# Patient Record
Sex: FEMALE | Race: WHITE | NOT HISPANIC OR LATINO | Employment: OTHER
[De-identification: names, ages, dates, MRNs, and addresses within clinical notes are randomized per-mention and may not be internally consistent; named-entity substitution may affect disease eponyms.]

---

## 2017-09-16 ENCOUNTER — HEALTH MAINTENANCE LETTER (OUTPATIENT)
Age: 32
End: 2017-09-16

## 2017-10-31 DIAGNOSIS — Z32.01 PREGNANCY TEST POSITIVE: Primary | ICD-10-CM

## 2017-11-09 ENCOUNTER — PRENATAL OFFICE VISIT (OUTPATIENT)
Dept: NURSING | Facility: CLINIC | Age: 32
End: 2017-11-09
Payer: COMMERCIAL

## 2017-11-09 DIAGNOSIS — Z32.01 PREGNANCY TEST POSITIVE: ICD-10-CM

## 2017-11-09 DIAGNOSIS — Z34.80 SUPERVISION OF OTHER NORMAL PREGNANCY, ANTEPARTUM: Primary | ICD-10-CM

## 2017-11-09 LAB
ABO + RH BLD: NORMAL
ABO + RH BLD: NORMAL
BETA HCG QUAL IFA URINE: POSITIVE
BLD GP AB SCN SERPL QL: NORMAL
BLOOD BANK CMNT PATIENT-IMP: NORMAL
ERYTHROCYTE [DISTWIDTH] IN BLOOD BY AUTOMATED COUNT: 12.1 % (ref 10–15)
HCT VFR BLD AUTO: 37.8 % (ref 35–47)
HGB BLD-MCNC: 13.2 G/DL (ref 11.7–15.7)
MCH RBC QN AUTO: 32.5 PG (ref 26.5–33)
MCHC RBC AUTO-ENTMCNC: 34.9 G/DL (ref 31.5–36.5)
MCV RBC AUTO: 93 FL (ref 78–100)
PLATELET # BLD AUTO: 233 10E9/L (ref 150–450)
RBC # BLD AUTO: 4.06 10E12/L (ref 3.8–5.2)
SPECIMEN EXP DATE BLD: NORMAL
WBC # BLD AUTO: 6.2 10E9/L (ref 4–11)

## 2017-11-09 PROCEDURE — 86900 BLOOD TYPING SEROLOGIC ABO: CPT | Performed by: OBSTETRICS & GYNECOLOGY

## 2017-11-09 PROCEDURE — 36415 COLL VENOUS BLD VENIPUNCTURE: CPT | Performed by: OBSTETRICS & GYNECOLOGY

## 2017-11-09 PROCEDURE — 86850 RBC ANTIBODY SCREEN: CPT | Performed by: OBSTETRICS & GYNECOLOGY

## 2017-11-09 PROCEDURE — 87086 URINE CULTURE/COLONY COUNT: CPT | Performed by: OBSTETRICS & GYNECOLOGY

## 2017-11-09 PROCEDURE — 85027 COMPLETE CBC AUTOMATED: CPT | Performed by: OBSTETRICS & GYNECOLOGY

## 2017-11-09 PROCEDURE — 84703 CHORIONIC GONADOTROPIN ASSAY: CPT | Performed by: OBSTETRICS & GYNECOLOGY

## 2017-11-09 PROCEDURE — 87389 HIV-1 AG W/HIV-1&-2 AB AG IA: CPT | Performed by: OBSTETRICS & GYNECOLOGY

## 2017-11-09 PROCEDURE — 86780 TREPONEMA PALLIDUM: CPT | Performed by: OBSTETRICS & GYNECOLOGY

## 2017-11-09 PROCEDURE — 86762 RUBELLA ANTIBODY: CPT | Performed by: OBSTETRICS & GYNECOLOGY

## 2017-11-09 PROCEDURE — 87340 HEPATITIS B SURFACE AG IA: CPT | Performed by: OBSTETRICS & GYNECOLOGY

## 2017-11-09 PROCEDURE — 86901 BLOOD TYPING SEROLOGIC RH(D): CPT | Performed by: OBSTETRICS & GYNECOLOGY

## 2017-11-09 PROCEDURE — 99207 ZZC NO CHARGE NURSE ONLY: CPT

## 2017-11-09 RX ORDER — PRENATAL VIT/IRON FUM/FOLIC AC 27MG-0.8MG
1 TABLET ORAL DAILY
COMMUNITY
End: 2018-07-18

## 2017-11-09 NOTE — NURSING NOTE
10w0d  Patient here for new prenatal nurse class. She has appt with Gayle Arce CNM on 11/10/17. Pap is not due. Last pap 8/11/16.    Tracy Crane RN

## 2017-11-09 NOTE — MR AVS SNAPSHOT
After Visit Summary   11/9/2017    Ernie Catherine    MRN: [id number]           Patient Information     Date Of Birth          1985        Visit Information        Provider Department      11/9/2017 9:00 AM RI PRENATAL NURSE [facility]        Today's Diagnoses     Supervision of other normal pregnancy, antepartum    -  1    Pregnancy test positive           Follow-ups after your visit        Your next 10 appointments already scheduled     Nov 10, 2017 10:00 AM CST   [facility] Prenatal with KELLY Francisco [facility]   [facility]    [address]  [facility] [id number] [phone]            Nov 10, 2017 12:00 PM CST   US OB < 14 WEEKS WITH TRANSVAGINAL SINGLE with SHUS5   [facility] ([facility])    [address] [place] [address] [phone]           Please bring a list of your medicines (including vitamins, minerals and over-the-counter drugs). Also, tell your doctor about any allergies you may have. Wear comfortable clothes and leave your valuables at home.  If you re less than 20 weeks drink four 8-ounce glasses of fluid an hour before your exam. If you need to empty your bladder before your exam, try to release only a little urine. Then, drink another glass of fluid.  You may have up to two family members in the exam room. If you bring a small child, an adult must be there to care for him or her.  Please call the Imaging Department at your exam site with any questions.              Future tests that were ordered for you today     Open Future Orders        Priority Expected Expires Ordered    US OB <14 Weeks w Transvaginal Single Routine  11/9/2018 11/9/2017            Who to contact     If you have questions or need follow up information about today's clinic visit or your schedule please contact [facility] directly at [phone].  Normal or non-critical  "lab and imaging results will be communicated to you by MyChart, letter or phone within 4 business days after the clinic has received the results. If you do not hear from us within 7 days, please contact the clinic through [employer] or phone. If you have a critical or abnormal lab result, we will notify you by phone as soon as possible.  Submit refill requests through [email or web address] or call your pharmacy and they will forward the refill request to us. Please allow 3 business days for your refill to be completed.          Additional Information About Your Visit        [employer] Information     [email or web address] lets you send messages to your doctor, view your test results, renew your prescriptions, schedule appointments and more. To sign up, go to [email or web address] . Click on \"Log in\" on the left side of the screen, which will take you to the Welcome page. Then click on \"Sign up Now\" on the right side of the page.     You will be asked to enter the access code listed below, as well as some personal information. Please follow the directions to create your username and password.     Your access code is: [email or web address]  Expires: 2018 11:14 AM     Your access code will  in 90 days. If you need help or a new code, please call your [facility] clinic or [phone].        Care EveryWhere ID     This is your Care EveryWhere ID. This could be used by other organizations to access your [facility] medical records  [id number]        Your Vitals Were     Last Period                   2017 (Exact Date)            Blood Pressure from Last 3 Encounters:   16 124/68   06/30/15 128/82   05/21/15 110/80    Weight from Last 3 Encounters:   16 175 lb (79.4 kg)   06/30/15 193 lb 9.6 oz (87.8 kg)   05/21/15 195 lb 9.6 oz (88.7 kg)              We Performed the Following     ABO/Rh type and screen     Anti Treponema     Beta HCG qual IFA urine - [facility] and [facility]     CBC with platelets     Hepatitis B surface antigen     HIV " Antigen Antibody Combo     Rubella Antibody IgG Quantitative     Urine Culture Aerobic Bacterial        Primary Care Provider Office Phone # Fax #    Kacy Olivo [phone]       [facility] [address] [place] [address]        Equal Access to Services     DOUGLAS ESTEBAN : Hadii aad ku hadasho Soomaali, waaxda luqadaha, qaybta kaalmada adeegyada, waxay idiin hayaan adeeg khbhavesh lashakila jackson. So [facility] [phone].    ATENCIÓN: Si habla español, tiene a farrell disposición servicios gratuitos de asistencia lingüística. Llame al [phone].    We comply with applicable federal civil rights laws and [place] laws. We do not discriminate on the basis of race, color, national origin, age, disability, sex, sexual orientation, or gender identity.            Thank you!     Thank you for choosing [facility]  for your care. Our goal is always to provide you with excellent care. Hearing back from our patients is one way we can continue to improve our services. Please take a few minutes to complete the written survey that you may receive in the mail after your visit with us. Thank you!             Your Updated Medication List - Protect others around you: Learn how to safely use, store and throw away your medicines at www.disposemymeds.org.          This list is accurate as of: 11/9/17 11:14 AM.  Always use your most recent med list.                   Brand Name Dispense Instructions for use Diagnosis    BENADRYL PO      Take by mouth nightly as needed        prenatal multivitamin plus iron 27-0.8 MG Tabs per tablet      Take 1 tablet by mouth daily        VITAMIN B6 PO

## 2017-11-10 ENCOUNTER — HOSPITAL ENCOUNTER (OUTPATIENT)
Dept: ULTRASOUND IMAGING | Facility: CLINIC | Age: 32
Discharge: HOME | End: 2017-11-10
Attending: ADVANCED PRACTICE MIDWIFE | Admitting: ADVANCED PRACTICE MIDWIFE
Payer: COMMERCIAL

## 2017-11-10 ENCOUNTER — PRENATAL OFFICE VISIT (OUTPATIENT)
Dept: OBGYN | Facility: CLINIC | Age: 32
End: 2017-11-10
Payer: COMMERCIAL

## 2017-11-10 ENCOUNTER — RESULT FOLLOW UP (OUTPATIENT)
Dept: OBGYN | Facility: CLINIC | Age: 32
End: 2017-11-10

## 2017-11-10 VITALS
HEART RATE: 88 BPM | SYSTOLIC BLOOD PRESSURE: 120 MMHG | HEIGHT: 68 IN | DIASTOLIC BLOOD PRESSURE: 68 MMHG | WEIGHT: 166 LBS | BODY MASS INDEX: 25.16 KG/M2

## 2017-11-10 DIAGNOSIS — R87.610 PAP SMEAR WITH ATYPICAL SQUAMOUS CELLS, CANNOT EXCLUDE HIGH GRADE SQUAMOUS INTRAEPITHELIAL LESION (ASC-H): ICD-10-CM

## 2017-11-10 DIAGNOSIS — Z34.81 ENCOUNTER FOR SUPERVISION OF OTHER NORMAL PREGNANCY, FIRST TRIMESTER: ICD-10-CM

## 2017-11-10 DIAGNOSIS — N87.1 DYSPLASIA OF CERVIX, HIGH GRADE CIN 2: ICD-10-CM

## 2017-11-10 DIAGNOSIS — Z11.3 SCREEN FOR STD (SEXUALLY TRANSMITTED DISEASE): Primary | ICD-10-CM

## 2017-11-10 DIAGNOSIS — Z34.80 SUPERVISION OF OTHER NORMAL PREGNANCY, ANTEPARTUM: ICD-10-CM

## 2017-11-10 LAB
BACTERIA SPEC CULT: NORMAL
HBV SURFACE AG SERPL QL IA: NONREACTIVE
HIV 1+2 AB+HIV1 P24 AG SERPL QL IA: NONREACTIVE
RUBV IGG SERPL IA-ACNC: 6 IU/ML
SPECIMEN SOURCE: NORMAL
T PALLIDUM IGG+IGM SER QL: NEGATIVE

## 2017-11-10 PROCEDURE — 76801 OB US < 14 WKS SINGLE FETUS: CPT

## 2017-11-10 PROCEDURE — G0476 HPV COMBO ASSAY CA SCREEN: HCPCS | Performed by: ADVANCED PRACTICE MIDWIFE

## 2017-11-10 PROCEDURE — G0124 SCREEN C/V THIN LAYER BY MD: HCPCS | Performed by: ADVANCED PRACTICE MIDWIFE

## 2017-11-10 PROCEDURE — 87591 N.GONORRHOEAE DNA AMP PROB: CPT | Performed by: ADVANCED PRACTICE MIDWIFE

## 2017-11-10 PROCEDURE — 87491 CHLMYD TRACH DNA AMP PROBE: CPT | Performed by: ADVANCED PRACTICE MIDWIFE

## 2017-11-10 PROCEDURE — G0145 SCR C/V CYTO,THINLAYER,RESCR: HCPCS | Performed by: ADVANCED PRACTICE MIDWIFE

## 2017-11-10 PROCEDURE — 99207 ZZC FIRST OB VISIT: CPT | Performed by: ADVANCED PRACTICE MIDWIFE

## 2017-11-10 NOTE — LETTER
November 25, 2019      Ernie Jimenes  [address] [address]    Dear ,      At [facility], your health and wellness is our primary concern. That is why we are following up on a Pap smear and ECC from 06/24/19. Your provider had recommended that you have a Pap smear and ECC completed by 12/24/19. Our records do not show that this has been scheduled.    It is important to complete the follow up that your provider has suggested for you to ensure that there are no worsening changes which may, over time, develop into cancer.      Please contact our office at  [phone] to schedule an appointment for a Pap smear and ECC at your earliest convenience. If you have questions or concerns, please call the clinic and we will be happy to assist you.    If you have completed the tests outside of [facility], please have the results forwarded to our office. We will update the chart for your primary Physician to review before your next annual physical.     Thank you for choosing [facility]!    Sincerely,      Your [facility] Care Team//[facility]

## 2017-11-10 NOTE — LETTER
April 30, 2019      Ernie Jimenes  [address] [address] [address]    Dear ,      At [facility], your health and wellness is our primary concern. That is why we are following up on a LEEP from 11/14/18, which was reported as MANUEL 3. Your provider had recommended that you have a Pap smear and HPV test completed by 05/14/19. Our records do not show that this has been scheduled.    It is important to complete the follow up that your provider has suggested for you to ensure that there are no worsening changes which may, over time, develop into cancer.      Please contact our office at  [phone] to schedule an appointment for a Pap smear and HPV test at your earliest convenience. If you have questions or concerns, please call the clinic and we will be happy to assist you.    If you have completed the tests outside of [facility], please have the results forwarded to our office. We will update the chart for your primary Physician to review before your next annual physical.     Thank you for choosing [facility]!    Sincerely,      Your [facility] Care Team/[facility]

## 2017-11-10 NOTE — MR AVS SNAPSHOT
After Visit Summary   11/10/2017    Ernie Catherine    MRN: [id number]           Patient Information     Date Of Birth          1985        Visit Information        Provider Department      11/10/2017 10:00 AM Gayle Arce APRN [facility]        Today's Diagnoses     Screen for STD (sexually transmitted disease)    -  1    Encounter for supervision of other normal pregnancy, first trimester           Follow-ups after your visit        Follow-up notes from your care team     Return in about 4 weeks (around 12/8/2017).      Your next 10 appointments already scheduled     Nov 10, 2017 12:00 PM CST   US OB < 14 WEEKS WITH TRANSVAGINAL SINGLE with SHUS5   [facility] ([facility])    [address] [place] [address] [phone]           Please bring a list of your medicines (including vitamins, minerals and over-the-counter drugs). Also, tell your doctor about any allergies you may have. Wear comfortable clothes and leave your valuables at home.  If you re less than 20 weeks drink four 8-ounce glasses of fluid an hour before your exam. If you need to empty your bladder before your exam, try to release only a little urine. Then, drink another glass of fluid.  You may have up to two family members in the exam room. If you bring a small child, an adult must be there to care for him or her.  Please call the Imaging Department at your exam site with any questions.            Dec 08, 2017  9:30 AM CST   ESTABLISHED PRENATAL with KELLY Francisco [facility] ([facility])    [address]  [facility] [id number] [phone]              Future tests that were ordered for you today     Open Future Orders        Priority Expected Expires Ordered    US OB <14 Weeks w Transvaginal Single Routine  11/9/2018 11/9/2017            Who to contact     If you have questions or need follow  "up information about today's clinic visit or your schedule please contact [facility] directly at [phone].  Normal or non-critical lab and imaging results will be communicated to you by MyChart, letter or phone within 4 business days after the clinic has received the results. If you do not hear from us within 7 days, please contact the clinic through [employer] or phone. If you have a critical or abnormal lab result, we will notify you by phone as soon as possible.  Submit refill requests through [employer] or call your pharmacy and they will forward the refill request to us. Please allow 3 business days for your refill to be completed.          Additional Information About Your Visit        MyChart Information     [employer] lets you send messages to your doctor, view your test results, renew your prescriptions, schedule appointments and more. To sign up, go to [email or web address] . Click on \"Log in\" on the left side of the screen, which will take you to the Welcome page. Then click on \"Sign up Now\" on the right side of the page.     You will be asked to enter the access code listed below, as well as some personal information. Please follow the directions to create your username and password.     Your access code is: [email or web address]  Expires: 2018 11:14 AM     Your access code will  in 90 days. If you need help or a new code, please call your [facility] clinic or [phone].        Care EveryWhere ID     This is your Care EveryWhere ID. This could be used by other organizations to access your [facility] medical records  [id number]        Your Vitals Were     Pulse Height Last Period Breastfeeding? BMI (Body Mass Index)       88 5' 8\" (1.727 m) 2017 (Exact Date) No 25.24 kg/m2        Blood Pressure from Last 3 Encounters:   11/10/17 120/68   16 124/68   06/30/15 128/82    Weight from Last 3 Encounters:   11/10/17 166 lb (75.3 kg)   16 175 lb (79.4 kg)   06/30/15 193 lb 9.6 oz " (87.8 kg)              We Performed the Following     CHLAMYDIA TRACHOMATIS PCR     HPV High Risk Types DNA Cervical     NEISSERIA GONORRHOEA PCR     Pap imaged thin layer screen with HPV - recommended age 30 - 65 years (select HPV order below)        Primary Care Provider Office Phone # Fax #    Kacy Olivo [phone]       [facility] [address] [address]        Equal Access to Services     [facility] : Hadii aad ku hadasho Soomaali, waaxda luqadaha, qaybta kaalmada adeegyada, waxay idiin hayaan adeeg kharash la'aan . So [facility] [phone].    ATENCIÓN: Si habla español, tiene a farrell disposición servicios gratuitos de asistencia lingüística. Llame al [phone].    We comply with applicable federal civil rights laws and [place] laws. We do not discriminate on the basis of race, color, national origin, age, disability, sex, sexual orientation, or gender identity.            Thank you!     Thank you for choosing [facility]  for your care. Our goal is always to provide you with excellent care. Hearing back from our patients is one way we can continue to improve our services. Please take a few minutes to complete the written survey that you may receive in the mail after your visit with us. Thank you!             Your Updated Medication List - Protect others around you: Learn how to safely use, store and throw away your medicines at www.disposemymeds.org.          This list is accurate as of: 11/10/17 10:55 AM.  Always use your most recent med list.                   Brand Name Dispense Instructions for use Diagnosis    BENADRYL PO      Take by mouth nightly as needed        prenatal multivitamin plus iron 27-0.8 MG Tabs per tablet      Take 1 tablet by mouth daily        VITAMIN B6 PO

## 2017-11-10 NOTE — NURSING NOTE
"Chief Complaint   Patient presents with     Prenatal Care     No concerns estimated 10 weeks 1 day        Initial /68 (BP Location: Right arm, Patient Position: Sitting, Cuff Size: Adult Regular)  Pulse 88  Ht 5' 8\" (1.727 m)  Wt 166 lb (75.3 kg)  LMP 08/31/2017 (Exact Date)  Breastfeeding? No  BMI 25.24 kg/m2 Estimated body mass index is 25.24 kg/(m^2) as calculated from the following:    Height as of this encounter: 5' 8\" (1.727 m).    Weight as of this encounter: 166 lb (75.3 kg).  Medication Reconciliation: complete   Dago Kelsey CMA       "

## 2017-11-10 NOTE — LETTER
[facility]  [address], [address]  [place] [address]  [phone]        November 13, 2017    Ernie Catherine  [address] [address] [address]            Dear MsVerito Ceronhaily:      The results of your recent GC Chlamydia were NORMAL.  Results for orders placed or performed in visit on 11/10/17   NEISSERIA GONORRHOEA PCR   Result Value Ref Range    Specimen Descrip Endocervical     N Gonorrhea PCR Negative NEG^Negative   CHLAMYDIA TRACHOMATIS PCR   Result Value Ref Range    Specimen Description Endocervical     Chlamydia Trachomatis PCR Negative NEG^Negative       If you have any further questions or problems, please contact our office.    Sincerely,      KELLY Zhao, ANNE MARIE

## 2017-11-10 NOTE — LETTER
July 26, 2018      Ernie Macarenatimo Jimenes  [address] [address] [facility] [address]    Dear MsLucy,      At [facility], your health and wellness is our primary concern. That is why we are following up on an abnormal pap from 11/10/17, which was reported as ASC-H and positive for high risk HPV 16. Your provider had recommended that you have a Colposcopy completed at your 6 week postpartum appointment. Our records do not show that this was done.    It is important to complete the follow up that your provider has suggested for you to ensure that there are no worsening changes which may, over time, develop into cancer.      Please contact our office at  [phone] to schedule an appointment for a Colposcopy at your earliest convenience. If you have questions or concerns, please call the clinic and we will be happy to assist you.    If you have completed the tests outside of [facility], please have the results forwarded to our office. We will update the chart for your primary Physician to review before your next annual physical.     Thank you for choosing [facility]!    Sincerely,      Manish Torres MD/[facility]

## 2017-11-10 NOTE — PROGRESS NOTES
PRENATAL VISIT   FIRST OBSTETRICAL EXAM - OB    Assessment / Impression     , Normal first prenatal visit at 10w1d      Plan:       1. Initial labs reviewed with patient today.    2. Pap smear today  3. GC/CT today  4. Denies any concerns with depression or anxiety.   5. Medications: Prenatal vitamins. Encouraged a vitamin D3, an omega 3 fatty acid supplement daily as well.   6. Problem list reviewed and updated.  7. Genetic screening: discussed and desires Progenity, will schedule after ultrasound to determine dates.  8. Role of ultrasound in pregnancy discussed; dating/viability ultrasound scheduled at [facility] today.   9. Optimal nutrition and weight gain discussed. Pregnancy weight gain of 15-25# encouraged.   10. Oriented to [facility] care and philosophy;  group, on-call and contact info discussed.  11. Appropriate anticipatory guidance including nutrition & supplements, weight gain recommendations, exercise, resources, lab testing & warning signs discussed.  .  12. Follow up: 4 weeks    TT with patient 40 minutes >50% time spent in counseling or coordination of care.    Subjective:    Ernie Catherine is a 31 year old  here today for her First Obstetrical Exam. Here alone.   Patient's last menstrual period was 2017 (exact date).  Last period was normal. Desires to follow with the midwives for this pregnancy.  States she has a history of anxiety after her last delivery.  Denies any current concerns.     Current symptoms also include: nausea in the evening, has been trying to use Vitamin B6, Benadryl at night.    Obstetric History       T1      L1     SAB1   TAB0   Ectopic0   Multiple0   Live Births1       # Outcome Date GA Lbr Shyam/2nd Weight Sex Delivery Anes PTL Lv   3 Current            2 Term 05/12/15 40w3d 07:21 / 04:06 6 lb 11.2 oz (3.039 kg) M Vag-Spont EPI N AMINAH      Name: Carlos      Apgar1:  9                Apgar5: 9   1 TAB  8w0d                 2018, by Last Menstrual  Period  Past Medical History:   Diagnosis Date     +++ OUTSIDE RECORDS SCAN  +++      Anxiety 7/14/2015     ASCUS on Pap smear 7/08    hpv 16 high risk, s/p colposcopy ob/gyn [place] basically nl     Esophageal reflux      History of colposcopy with cervical biopsy 08/11/16     neg     LSIL (low grade squamous intraepithelial lesion) on Pap smear 05/14/10     No active medical problems      Personal history of tobacco use, presenting hazards to health      Past Surgical History:   Procedure Laterality Date     COLPOSCOPY,BX CERVIX/ENDOCERV CURR  8/08    basically nl, per Dr. Grant [facility]     DENTAL SURGERY  2003     HC CREATE EARDRUM OPENING,GEN ANESTH  age 1    P.E. Tubes     NO       Social History   Substance Use Topics     Smoking status: Former Smoker     Packs/day: 0.25     Years: 4.00     Types: Cigarettes     Smokeless tobacco: Never Used     Alcohol use No     Current Outpatient Prescriptions   Medication Sig Dispense Refill     DiphenhydrAMINE HCl (BENADRYL PO) Take by mouth nightly as needed       Prenatal Vit-Fe Fumarate-FA (PRENATAL MULTIVITAMIN PLUS IRON) 27-0.8 MG TABS per tablet Take 1 tablet by mouth daily       Pyridoxine HCl (VITAMIN B6 PO)        Allergies   Allergen Reactions     No Known Drug Allergies           High Risk Behavior: none    Review of Systems  General:  Denies problem  Eyes: Denies problem  Ears/Nose/Throat: Denies problem  Cardiovascular: Denies problem  Respiratory:  Denies problem  Gastrointestinal:  Denies problem, Genitourinary: Denies problem  Musculoskeletal:  Denies problem  Skin: Denies problem  Neurologic: Denies problem  Psychiatric: Denies problem  Endocrine: Denies problem        Objective:     Physical Exam:  General Appearance: Alert, cooperative, no distress, appears stated age  Skin: Skin color, texture, turgor normal, no rashes or lesions  Throat: Lips, mucosa, and tongue normal; teeth and gums normal  HEENT: grossly normal; otoscopic and opthalmic exam  not performed.   Neck: Supple, symmetrical, trachea midline, no adenopathy;  thyroid: not enlarged, symmetric, no tenderness/mass/nodules  Lungs: Clear to auscultation bilaterally, respirations unlabored  Breasts: No breast masses, tenderness, asymmetry, or nipple discharge.  Heart: Regular rate and rhythm, S1 and S2 normal, no murmur, rub, or gallop   Abdomen: Soft, non-tender, no masses, no organomegaly  Pelvic:Pelvic exam: normal vagina and vulva, normal cervix without lesions or tenderness, uterus 10 week size adenxa normal in size without tenderness, pap smear and GC/CT collected.  Extremities: Extremities normal without varicosities or edema      Lab:   Results for orders placed or performed in visit on 11/09/17   CBC with platelets   Result Value Ref Range    WBC 6.2 4.0 - 11.0 10e9/L    RBC Count 4.06 3.8 - 5.2 10e12/L    Hemoglobin 13.2 11.7 - 15.7 g/dL    Hematocrit 37.8 35.0 - 47.0 %    MCV 93 78 - 100 fl    MCH 32.5 26.5 - 33.0 pg    MCHC 34.9 31.5 - 36.5 g/dL    RDW 12.1 10.0 - 15.0 %    Platelet Count 233 150 - 450 10e9/L   Hepatitis B surface antigen   Result Value Ref Range    Hep B Surface Agn Nonreactive NR^Nonreactive   Beta HCG qual IFA urine - [facility] and [facility] [place]   Result Value Ref Range    Beta HCG Qual IFA Urine Positive (A) NEG^Negative      HIV Antigen Antibody Combo   Result Value Ref Range    HIV Antigen Antibody Combo Nonreactive NR^Nonreactive       ABO/Rh type and screen   Result Value Ref Range    ABO A     RH(D) Pos     Antibody Screen Neg     Test Valid Only At [facility]        Specimen Expires 11/12/2017

## 2017-11-12 LAB
C TRACH DNA SPEC QL NAA+PROBE: NEGATIVE
N GONORRHOEA DNA SPEC QL NAA+PROBE: NEGATIVE
SPECIMEN SOURCE: NORMAL
SPECIMEN SOURCE: NORMAL

## 2017-11-16 LAB
COPATH REPORT: ABNORMAL
PAP: ABNORMAL

## 2017-11-17 LAB
FINAL DIAGNOSIS: ABNORMAL
HPV HR 12 DNA CVX QL NAA+PROBE: NEGATIVE
HPV16 DNA SPEC QL NAA+PROBE: POSITIVE
HPV18 DNA SPEC QL NAA+PROBE: NEGATIVE
SPECIMEN DESCRIPTION: ABNORMAL

## 2017-11-17 NOTE — PROGRESS NOTES
"02/17/05 NL pap  04/07/06 NL pap  07/02/07 NL pap  07/23/08 ASCUS pap +HPV #16 , pt. Is to schedule a colposcopy, pt. [facility]  05/14/10 LSIL pap, pt. returned to [facility] for a colp.  10/14/14 NL pap, + HPV #16 HR, pt. Is pregnant per DR. Torres, pt. Is to have a postpartum colposcopy, pts. EDC 5-10-14.  05/27/15 Pt. Delivered on 5-12-15, will do a postpartum colposcopy  07/22/15 Detailed message left on pts. Cell phone per pt. Consent to schedule a colposcopy  08/10/16 Nurse Dill spoke with pt.,she states that DR. Torres told her to schedule a colposcopy three months from her postpartum visit.  09/15/15 Reminder letter sent to pt. To schedule a colposcopy.  10/14/15 Telephone message out to DR. Torres to put pt. In the \"inactive file\" for pap tracking.  10/20/15 Due to no response from pt. Through telephone calls and reminder letters to schedule a postpartum colposcopy, pt. Has been put in the \"inactive file\" for pap tracking per DR. Torres.  08/11/16 [place]: ECC negative. Plan: co-testing in one year.  11/10/17 ASC-H pap, + HR HPV # 16. 11w1d pregnant. Plan post partum colposcopy. EDC of 6/7/18. (LN)   11/27/17 Left message for patient to call back. (LN) Patient notified of results/recommendations. (LN) Tracking updated to pt's due date 6/7/18 -- pt to schedule colp at 6 week postpartum visit (ajk)  6/12/18 Pt delivered 6/5/18, [place] due at 6wk PP 7/18/18 (rlm)  07/18/18 Discussed that patient is due for colposcopy. She will schedule at her convenience. ([facility])  07/26/18 [place] reminder letter sent. ([facility])  8/20/18 3mo [place] not done, updated to 6mo [place]/Pap due by 10/18/18 (rl) // 08/28/18 FYI sent to provider (azeem)  10/16/18 [place] ECC: at least MANUEL 2. Plan LEEP ([facility])  11/14/18 LEEP: MANUEL 3, pos margins. Plan 6 month cotest ([facility])  04/30/19 Cotest reminder letter sent. ([facility])  6/24/19 ECC: benign. Dx NIL pap, neg HR HPV. Plan repeat pap and ECC in 4-6 months ([facility])  11/25/19 Pap and ECC reminder letter " sent. ([facility])  1/3/20 ECC: benign. Dx NIL pap, neg HR HPV. Plan cotest due 11/2020 per provider (alla) Clinic staff left VM for pt with ECC information (alla)  01/13/20 Pt notified (alla)

## 2017-11-28 ENCOUNTER — PRE VISIT (OUTPATIENT)
Dept: MATERNAL FETAL MEDICINE | Facility: CLINIC | Age: 32
End: 2017-11-28

## 2017-12-01 ENCOUNTER — HOSPITAL ENCOUNTER (OUTPATIENT)
Dept: LAB | Facility: CLINIC | Age: 32
End: 2017-12-01
Attending: ADVANCED PRACTICE MIDWIFE
Payer: COMMERCIAL

## 2017-12-01 ENCOUNTER — HOSPITAL ENCOUNTER (OUTPATIENT)
Dept: ULTRASOUND IMAGING | Facility: CLINIC | Age: 32
Discharge: HOME | End: 2017-12-01
Attending: ADVANCED PRACTICE MIDWIFE | Admitting: ADVANCED PRACTICE MIDWIFE
Payer: COMMERCIAL

## 2017-12-01 ENCOUNTER — OFFICE VISIT (OUTPATIENT)
Dept: MATERNAL FETAL MEDICINE | Facility: CLINIC | Age: 32
End: 2017-12-01
Attending: ADVANCED PRACTICE MIDWIFE
Payer: COMMERCIAL

## 2017-12-01 DIAGNOSIS — O26.90 PREGNANCY RELATED CONDITION, ANTEPARTUM: ICD-10-CM

## 2017-12-01 DIAGNOSIS — Z13.9 VISIT FOR SCREENING: Primary | ICD-10-CM

## 2017-12-01 DIAGNOSIS — Z13.9 VISIT FOR SCREENING: ICD-10-CM

## 2017-12-01 DIAGNOSIS — Z36.9 FIRST TRIMESTER SCREENING: Primary | ICD-10-CM

## 2017-12-01 PROCEDURE — 40000072 ZZH STATISTIC GENETIC COUNSELING, < 16 MIN: Mod: ZF | Performed by: GENETIC COUNSELOR, MS

## 2017-12-01 PROCEDURE — 40000791 ZZHCL STATISTIC VERIFI PRENATAL TRISOMY 21,18,13: Performed by: OBSTETRICS & GYNECOLOGY

## 2017-12-01 PROCEDURE — 76813 OB US NUCHAL MEAS 1 GEST: CPT

## 2017-12-01 PROCEDURE — 36415 COLL VENOUS BLD VENIPUNCTURE: CPT | Performed by: OBSTETRICS & GYNECOLOGY

## 2017-12-01 NOTE — MR AVS SNAPSHOT
"              After Visit Summary   12/1/2017    Ernie Catherine    MRN: [id number]           Patient Information     Date Of Birth          1985        Visit Information        Provider Department      12/1/2017 2:45 PM Deepthi Archuleta MD [facility] Maternal Fetal Medicine [facility]        Today's Diagnoses     First trimester screening    -  1       Follow-ups after your visit        Your next 10 appointments already scheduled     Dec 08, 2017  9:30 AM CST   ESTABLISHED PRENATAL with KELLY Francisco CNM   [facility] ([facility])    [address]  [facility] [address]   [phone]              Future tests that were ordered for you today     Open Future Orders        Priority Expected Expires Ordered    Non Invasive Prenatal Test Cell Free DNA Routine 12/1/2017 12/1/2018 12/1/2017            Who to contact     If you have questions or need follow up information about today's clinic visit or your schedule please contact [facility] MATERNAL FETAL MEDICINE [facility] directly at [phone].  Normal or non-critical lab and imaging results will be communicated to you by [employer], letter or phone within 4 business days after the clinic has received the results. If you do not hear from us within 7 days, please contact the clinic through [employer] or phone. If you have a critical or abnormal lab result, we will notify you by phone as soon as possible.  Submit refill requests through [employer] or call your pharmacy and they will forward the refill request to us. Please allow 3 business days for your refill to be completed.          Additional Information About Your Visit        [employer] Information     [employer] lets you send messages to your doctor, view your test results, renew your prescriptions, schedule appointments and more. To sign up, go to [email or web address] . Click on \"Log in\" on the left side of the screen, which will take you to the Welcome page. Then click on " "\"Sign up Now\" on the right side of the page.     You will be asked to enter the access code listed below, as well as some personal information. Please follow the directions to create your username and password.     Your access code is: [email or web address]  Expires: 2018 11:14 AM     Your access code will  in 90 days. If you need help or a new code, please call your [place] clinic or [phone].        Care EveryWhere ID     This is your Care EveryWhere ID. This could be used by other organizations to access your [place] medical records  [id number]        Your Vitals Were     Last Period                   2017 (Exact Date)            Blood Pressure from Last 3 Encounters:   11/10/17 120/68   16 124/68   06/30/15 128/82    Weight from Last 3 Encounters:   11/10/17 75.3 kg (166 lb)   16 79.4 kg (175 lb)   06/30/15 87.8 kg (193 lb 9.6 oz)              Today, you had the following     No orders found for display       Primary Care Provider Office Phone # Fax #    Kacy Olivo [phone]       [facility] [place] [address] [place] [address]        Equal Access to Services     WINNIE ORELLANA AH: Hadii marisela ku hadasho Soomaali, waaxda luqadaha, qaybta kaalmada adeegyada, veronique sandoval hayrachel degroot . So [facility] [phone].    ATENCIÓN: Si habla español, tiene a farrell disposición servicios gratuitos de asistencia lingüística. Llame al [phone].    We comply with applicable federal civil rights laws and [place] laws. We do not discriminate on the basis of race, color, national origin, age, disability, sex, sexual orientation, or gender identity.            Thank you!     Thank you for choosing [facility]  for your care. Our goal is always to provide you with excellent care. Hearing back from our patients is one way we can continue to improve our services. Please take a few minutes to complete the written survey that you may " receive in the mail after your visit with us. Thank you!             Your Updated Medication List - Protect others around you: Learn how to safely use, store and throw away your medicines at www.disposemymeds.org.          This list is accurate as of: 12/1/17  3:16 PM.  Always use your most recent med list.                   Brand Name Dispense Instructions for use Diagnosis    BENADRYL PO      Take by mouth nightly as needed        prenatal multivitamin plus iron 27-0.8 MG Tabs per tablet      Take 1 tablet by mouth daily        VITAMIN B6 PO

## 2017-12-01 NOTE — MR AVS SNAPSHOT
"              After Visit Summary   12/1/2017    Ernie Catherine    MRN: [id number]           Patient Information     Date Of Birth          1985        Visit Information        Provider Department      12/1/2017 1:30 PM RH GEN COUNSELOR 1 [facility] Maternal Fetal Medicine [facility]        Today's Diagnoses     Visit for screening    -  1    Pregnancy related condition, antepartum           Follow-ups after your visit        Your next 10 appointments already scheduled     Dec 08, 2017  9:30 AM CST   ESTABLISHED PRENATAL with KELLY Francisco CNM   [facility]    [address]  [facility] [address]   [phone]              Future tests that were ordered for you today     Open Future Orders        Priority Expected Expires Ordered    Non Invasive Prenatal Test Cell Free DNA Routine 12/1/2017 12/1/2018 12/1/2017            Who to contact     If you have questions or need follow up information about today's clinic visit or your schedule please contact [facility] MATERNAL FETAL MEDICINE [facility] directly at [phone].  Normal or non-critical lab and imaging results will be communicated to you by [employer], letter or phone within 4 business days after the clinic has received the results. If you do not hear from us within 7 days, please contact the clinic through [employer] or phone. If you have a critical or abnormal lab result, we will notify you by phone as soon as possible.  Submit refill requests through [employer] or call your pharmacy and they will forward the refill request to us. Please allow 3 business days for your refill to be completed.          Additional Information About Your Visit        [employer] Information     [employer] lets you send messages to your doctor, view your test results, renew your prescriptions, schedule appointments and more. To sign up, go to [email or web address] . Click on \"Log in\" on the left side of the screen, which will take " "you to the Welcome page. Then click on \"Sign up Now\" on the right side of the page.     You will be asked to enter the access code listed below, as well as some personal information. Please follow the directions to create your username and password.     Your access code is: [email or web address]  Expires: 2018 11:14 AM     Your access code will  in 90 days. If you need help or a new code, please call your [place] clinic or [phone].        Care EveryWhere ID     This is your Care EveryWhere ID. This could be used by other organizations to access your [place] medical records  [id number]        Your Vitals Were     Last Period                   2017 (Exact Date)            Blood Pressure from Last 3 Encounters:   11/10/17 120/68   16 124/68   06/30/15 128/82    Weight from Last 3 Encounters:   11/10/17 75.3 kg (166 lb)   16 79.4 kg (175 lb)   06/30/15 87.8 kg (193 lb 9.6 oz)              We Performed the Following     [facility] Genetic Counseling        Primary Care Provider Office Phone # Fax #    Kacy Olivo [phone]       [place] [address]        Equal Access to Services     [facility]: Hadii marisela beltrán hadasho Soshawandaali, waaxda luqadaha, qaybta kaalmada adeegyada, veronique degroot . So [facility] [phone].    ATENCIÓN: Si habla español, tiene a farrell disposición servicios gratuitos de asistencia lingüística. Llame al [phone].    We comply with applicable federal civil rights laws and [place] laws. We do not discriminate on the basis of race, color, national origin, age, disability, sex, sexual orientation, or gender identity.            Thank you!     Thank you for choosing [facility]  for your care. Our goal is always to provide you with excellent care. Hearing back from our patients is one way we can continue to improve our services. Please take a few minutes to complete " the written survey that you may receive in the mail after your visit with us. Thank you!             Your Updated Medication List - Protect others around you: Learn how to safely use, store and throw away your medicines at www.disposemymeds.org.          This list is accurate as of: 12/1/17  3:01 PM.  Always use your most recent med list.                   Brand Name Dispense Instructions for use Diagnosis    BENADRYL PO      Take by mouth nightly as needed        prenatal multivitamin plus iron 27-0.8 MG Tabs per tablet      Take 1 tablet by mouth daily        VITAMIN B6 PO

## 2017-12-01 NOTE — PROGRESS NOTES
[facility] [place]  Genetic Counseling Consult    Patient: Ernie Catherine YOB: 1985   Date of Service: 17      Ernie Catherine was seen at [facility] [place] for genetic consultation to discuss the options for screening and testing for fetal chromosome abnormalities.  The indication for genetic counseling is routine screening for aneuploidy.        Impression/Plan:   1.  Ernie had an ultrasound and blood draw for NIPT (Innatal test through [employer]).  Results are expected within 7-10 days, and will be available in [facility].  We will contact her to discuss the results, and a copy will be forwarded to the office of the referring OB provider.  Ernie will be contacted at the phone number she provided, [phone], and results will be left in her voicemail if she cannot be reached.    2.  Maternal serum AFP (single marker screen) is recommended after 15 weeks to screen for open neural tube defects. A quad screen should not be performed.    3.  An 18-20 week ultrasound to screen for birth defects and markers of chromosome abnormalities is available as an additional screening option.    4.  Ernie will contact us if she wishes to discuss carrier screening for CAH to clarify risks for this condition in her pregnancy, as she is at increased risk for an affected pregnancy given her 's affected status.        Pregnancy History:   /Parity:    Age at Delivery: 32 year old  RILEY: 2018, by Last Menstrual Period  Gestational Age: 13w1d    No significant complications or exposures were reported in the current pregnancy.    Ernie felix pregnancy history is significant for 1 prior full term pregnancy with no reported complications.    Medical History:   Ernie felix reported medical history is not expected to impact pregnancy management or risks to fetal development.       Family History:   A three-generation pedigree was obtained, and is scanned  under the  Media  tab.   The following significant findings were reported by Ernie:    Son, 2 years old, healthy and developmentally on track    Sister, PCOS, otherwise healthy, twin pregnancy conceived via IVF, currently pregnant again via IVF    Parents alive and well in their 60s    FOB: 31, Congenital Adrenal Hyperplasia, manages with daily steroids, otherwise healthy, unknown if he has ever had genetic testing or is clinically diagnosed only.      FOB: father  in his 50's from reported brain tumor, no further information available    FOB: mother alive and well at 64    Otherwise, the reported family history is negative for multiple miscarriages, stillbirths, birth defects, cognitive impairment, known genetic conditions, and consanguinity.    Congenital Adrenal Hyperplasia:  CAH is an autosomal recessive genetic condition that is caused by mutations in the [id number] gene. There are two types of CAH: classical CAH and non-classical CAH. The classical type of CAH can be further divide into the salt wasting type of CAH which is the most severe form of the condition, and the simple virilizing type of CAH. The salt wasting type of classical CAH can be life threatening in early infancy and infants can have poor feeding, dehydration, and vomiting. Females with the salt wasting type of CAH can also have ambiguous genitalia.  Every individual has two copies of the gene that is responsible for this condition, and if someone has a change or mutation that impacts how one copy of the gene functions, they are called a carrier for the condition.  If someone has two copies of the gene that have a harmful change, they are affected with the condition.   Since Ernie's  is affected, there is a 100% chance of him passing on a mutation to each of his children. If Ernie is a carrier for CAH, there would be a 50% risk to have a child with CAH. More specifically, the likelihood she is a carrier (the general population  carrier risk) is 1 in 60. Without any additional testing, the overall risk to have an affected child is 1 in 120 (Ernie's carrier risk x 50% risk to pass on that mutation). She was offered carrier screening and potentially invasive testing but declined testing at today's visit. Ernie plans to discuss this with her  and contact us if she wishes to arrange for genetic carrier testing.  CAH is on [place]'s  screen and Ernie reports feeling a level of comfort knowing that that testing will occur after her child is born, regardless of any testing during pregnancy.  We discussed that some individuals might consider testing during a pregnancy because they might choose not to continue an affected pregnancy.  We also discussed that because Ernie's  is affected, each of his children will at least be carriers for CAH, and that this information could be important to them in the future if they decide to have children of their own.         Carrier Screening:       Carrier screening was discussed today only within the scope of carrier testing for CAH.  Carrier testing for other genetic conditions such as cystic fibrosis, SMA, or fragile X syndrome is available to any individuals interested in learning more regarding their risks for these conditions.         Risk Assessment for Chromosome Conditions:   We explained that the risk for fetal chromosome abnormalities increases with maternal age. We discussed specific features of common chromosome abnormalities, including Down syndrome, trisomy 13, trisomy 18, and sex chromosome trisomies.      - At age 31 at midtrimester, the risk to have a baby with Down syndrome is 1 in 508.     - At age 31 at midtrimester, the risk to have a baby with any chromosome abnormality is 1 in 254.     - At age 32 at delivery, the risk to have a baby with Down syndrome is 1 in 625.     - At age 32 at delivery, the risk to have a baby with any chromosome abnormality is 1 in 312.           Testing Options:   We discussed the following options:   First trimester screening    First trimester ultrasound with nuchal translucency and nasal bone assessments, maternal plasma hCG, SO-A, and AFP measurement    Screens for fetal trisomy 21, trisomy 13, and trisomy 18    Cannot screen for open neural tube defects; maternal serum AFP after 15 weeks is recommended     Non-invasive Prenatal Testing (NIPT)    Maternal plasma cell-free DNA testing; first trimester ultrasound with nuchal translucency and nasal bone assessment is recommended, when appropriate    Screens for fetal trisomy 21, trisomy 13, trisomy 18, and sex chromosome aneuploidy    Cannot screen for open neural tube defects; maternal serum AFP after 15 weeks is recommended     Genetic Amniocentesis    Invasive procedure typically performed in the second trimester by which amniotic fluid is obtained for the purpose of chromosome analysis and/or other prenatal genetic analysis    Diagnostic results; >99% sensitivity for fetal chromosome abnormalities    AFAFP measurement tests for open neural tube defects     Comprehensive (Level II) ultrasound:     Detailed ultrasound performed between 18-22 weeks gestation    Screen for major birth defects and markers for aneuploidy    We reviewed the benefits and limitations of this testing.  Screening tests provide a risk assessment specific to the pregnancy for certain fetal chromosome abnormalities, but cannot definitively diagnose or exclude a fetal chromosome abnormality.  Follow-up genetic counseling and consideration of diagnostic testing is recommended with any abnormal screening result. Diagnostic tests carry inherent risks- including risk of miscarriage- that require careful consideration.  These tests can detect fetal chromosome abnormalities with greater than 99% certainty.  There is no screening nor diagnostic test that can detect all forms of birth defects or mental disability.    Ernie rivers  interest in having NIPT to screen for chromosome abnormalities, because of the lower risks for false positives relative to first trimester screening.  We discussed that NIPT is a screening test, and that the accuracy of the test was established in a high risk population, which may not accurately reflect the accuracy of the test for Ernie, because she is not considered high risk for these conditions.  We discussed that for the low risk population, the likelihood that a positive results is a false positive increases.  Ernie understands this and knows that if she has a concerning result, we will talk specifically about what the likelihood her result is a false positive.  Also briefly discussed is the fact that some insurance companies do not cover NIPT for the low risk population, and that the cost of the test can vary widely depending on insurance coverage.  [employer], the company that provides NIPT testing for our clinic, can work with patients to adjust the cost of testing, but that is a process that is arranged directly between the lab and the patient.  I provided Ernie with the contact information for [employer]'s customer service so that she can contact them to discuss this if she wishes.       It was a pleasure to be involved with Ernie s care. Face-to-face time of the meeting was 40 minutes.      Jamil Foster MS, [facility]  Certified Genetic Counselor  Phone: [phone]  Pager: [phone]

## 2017-12-01 NOTE — PROGRESS NOTES
"Please see \"Imaging\" tab under \"Chart Review\" for details of today's US at the [facility].    Dennis Martinez MD  Maternal-Fetal Medicine    "

## 2017-12-07 ENCOUNTER — TELEPHONE (OUTPATIENT)
Dept: MATERNAL FETAL MEDICINE | Facility: CLINIC | Age: 32
End: 2017-12-07

## 2017-12-07 NOTE — TELEPHONE ENCOUNTER
12/7/2017       Called Ernie to discuss NIPT results.  Results came back negative for chromosome abnormalities in chromosomes 21, 18, & 13, as well as the sex chromosomes.  The test identified sex chromosomes consistent with male sex (XY).  These test results do not definitively rule out the possibility of one of these conditions, but they do greatly reduce the likelihood.  This information was left in Ernie's voicemail per plan established at her genetic counseling appointment, and Ernie was encouraged to reach out if she has any questions or concerns.      Jamil Foster MS, [facility]  Certified Genetic Counselor  Phone: [phone]  Pager: [phone]

## 2017-12-08 ENCOUNTER — PRENATAL OFFICE VISIT (OUTPATIENT)
Dept: OBGYN | Facility: CLINIC | Age: 32
End: 2017-12-08
Payer: COMMERCIAL

## 2017-12-08 VITALS
WEIGHT: 160.9 LBS | DIASTOLIC BLOOD PRESSURE: 76 MMHG | TEMPERATURE: 98.2 F | BODY MASS INDEX: 24.46 KG/M2 | SYSTOLIC BLOOD PRESSURE: 120 MMHG

## 2017-12-08 DIAGNOSIS — Z23 NEED FOR PROPHYLACTIC VACCINATION AND INOCULATION AGAINST INFLUENZA: ICD-10-CM

## 2017-12-08 DIAGNOSIS — R87.610 PAP SMEAR WITH ATYPICAL SQUAMOUS CELLS, CANNOT EXCLUDE HIGH GRADE SQUAMOUS INTRAEPITHELIAL LESION (ASC-H): ICD-10-CM

## 2017-12-08 DIAGNOSIS — Z34.82 ENCOUNTER FOR SUPERVISION OF OTHER NORMAL PREGNANCY IN SECOND TRIMESTER: Primary | ICD-10-CM

## 2017-12-08 PROBLEM — Z28.39 RUBELLA NON-IMMUNE STATUS, ANTEPARTUM: Status: ACTIVE | Noted: 2017-12-08

## 2017-12-08 PROBLEM — O09.899 RUBELLA NON-IMMUNE STATUS, ANTEPARTUM: Status: ACTIVE | Noted: 2017-12-08

## 2017-12-08 PROCEDURE — 90686 IIV4 VACC NO PRSV 0.5 ML IM: CPT | Performed by: ADVANCED PRACTICE MIDWIFE

## 2017-12-08 PROCEDURE — 99207 ZZC PRENATAL VISIT: CPT | Performed by: ADVANCED PRACTICE MIDWIFE

## 2017-12-08 PROCEDURE — 90471 IMMUNIZATION ADMIN: CPT | Performed by: ADVANCED PRACTICE MIDWIFE

## 2017-12-08 NOTE — MR AVS SNAPSHOT
After Visit Summary   12/8/2017    Ernie Catherine    MRN: [id number]           Patient Information     Date Of Birth          1985        Visit Information        Provider Department      12/8/2017 9:30 AM Gayle Arce APRN [facility]        Today's Diagnoses     Encounter for supervision of other normal pregnancy in second trimester    -  1    Need for prophylactic vaccination and inoculation against influenza        Pap smear with atypical squamous cells, cannot exclude high grade squamous intraepithelial lesion (ASC-H)           Follow-ups after your visit        Follow-up notes from your care team     Return in about 4 weeks (around 1/5/2018).      Your next 10 appointments already scheduled     Jan 05, 2018  9:30 AM CST   (Arrive by 9:15 AM)   [facility]   [facility] Maternal Fetal Medicine Ultrasound - [facility])    [address]  [place] [id number] [phone]           Wear comfortable clothes and leave your valuables at home.            Jan 05, 2018 10:00 AM CST   Radiology MD with [facility] MD   [facility] Maternal Fetal Medicine [facility])    [address]  [place] [address]   [phone]           Please arrive at the time given for your first appointment. This visit is used internally to schedule the physician's time during your ultrasound.            Jan 05, 2018 10:30 AM CST   ESTABLISHED PRENATAL with KELLY Francisco CNM   [facility] ([facility])    [address]  [place] [address]   [phone]            Feb 02, 2018  9:30 AM CST   ESTABLISHED PRENATAL with KELLY Francisco CNM   [facility] ([facility])    [address]  [place] [address]   [phone]            Mar 02, 2018  9:30 AM CST   ESTABLISHED PRENATAL with KELLY Francisco  "CNM   [facility] ([facility])    [address]  [facility] [id number] [phone]              Who to contact     If you have questions or need follow up information about today's clinic visit or your schedule please contact [facility] directly at [phone].  Normal or non-critical lab and imaging results will be communicated to you by MyChart, letter or phone within 4 business days after the clinic has received the results. If you do not hear from us within 7 days, please contact the clinic through MyChart or phone. If you have a critical or abnormal lab result, we will notify you by phone as soon as possible.  Submit refill requests through [employer] or call your pharmacy and they will forward the refill request to us. Please allow 3 business days for your refill to be completed.          Additional Information About Your Visit        [employer] Information     [employer] lets you send messages to your doctor, view your test results, renew your prescriptions, schedule appointments and more. To sign up, go to [email or web address] . Click on \"Log in\" on the left side of the screen, which will take you to the Welcome page. Then click on \"Sign up Now\" on the right side of the page.     You will be asked to enter the access code listed below, as well as some personal information. Please follow the directions to create your username and password.     Your access code is: [email or web address]  Expires: 2018 11:14 AM     Your access code will  in 90 days. If you need help or a new code, please call your [facility] or [phone].        Care EveryWhere ID     This is your Care EveryWhere ID. This could be used by other organizations to access your [place] medical records  [id number]        Your Vitals Were     Temperature Last Period BMI (Body Mass Index)             98.2  F (36.8  C) (Oral) 2017 (Exact Date) 24.46 kg/m2          Blood " Pressure from Last 3 Encounters:   12/08/17 120/76   11/10/17 120/68   08/11/16 124/68    Weight from Last 3 Encounters:   12/08/17 160 lb 14.4 oz (73 kg)   11/10/17 166 lb (75.3 kg)   08/11/16 175 lb (79.4 kg)              We Performed the Following     FLU VAC, SPLIT VIRUS IM > 3 YO (QUADRIVALENT) [62496]     Vaccine Administration, Initial [78660]        Primary Care Provider Office Phone # Fax #    Kacy Olivo [phone]       [facility] [place] [address] [place] [address]        Equal Access to Services     WINNIE ORELLANA : Naseem Joiner, waremi montilla, qaybta kaalmada renate, veronique jackson. So [facility] [phone].    ATENCIÓN: Si habla español, tiene a farrell disposición servicios gratuitos de asistencia lingüística. Llame al [phone].    We comply with applicable federal civil rights laws and [place] laws. We do not discriminate on the basis of race, color, national origin, age, disability, sex, sexual orientation, or gender identity.            Thank you!     Thank you for choosing [facility]  for your care. Our goal is always to provide you with excellent care. Hearing back from our patients is one way we can continue to improve our services. Please take a few minutes to complete the written survey that you may receive in the mail after your visit with us. Thank you!             Your Updated Medication List - Protect others around you: Learn how to safely use, store and throw away your medicines at www.disposemymeds.org.          This list is accurate as of: 12/8/17  2:29 PM.  Always use your most recent med list.                   Brand Name Dispense Instructions for use Diagnosis    BENADRYL PO      Take by mouth nightly as needed        prenatal multivitamin plus iron 27-0.8 MG Tabs per tablet      Take 1 tablet by mouth daily        VITAMIN B6 PO

## 2017-12-08 NOTE — PROGRESS NOTES

## 2017-12-08 NOTE — NURSING NOTE
"Chief Complaint   Patient presents with     Prenatal Care       Initial /76 (BP Location: Right arm, Patient Position: Chair, Cuff Size: Adult Regular)  Temp 98.2  F (36.8  C) (Oral)  Wt 160 lb 14.4 oz (73 kg)  LMP 08/31/2017 (Exact Date)  BMI 24.46 kg/m2 Estimated body mass index is 24.46 kg/(m^2) as calculated from the following:    Height as of 11/10/17: 5' 8\" (1.727 m).    Weight as of this encounter: 160 lb 14.4 oz (73 kg).  Medication Reconciliation: complete    "

## 2017-12-08 NOTE — PROGRESS NOTES
Ernie Catherine is here accompanied today by son for a routine prenatal visit at 14w1d.  She has has no specific complaints and has been feeling well. Reviewed IOB lab results and need for Rubella vaccine postpartum.  Reviewed recommendation for colposcopy postpartum.  First trimester screening results reviewed.  Discussed option  for AFP, patient will consider.  Appetite is normal.  Anticipatory guidance, warning signs, when to call and CNM contact information reviewed.  Desires flu vaccine today.

## 2017-12-08 NOTE — PROGRESS NOTES
Injectable Influenza Immunization Documentation    1.  Is the person to be vaccinated sick today?   No    2. Does the person to be vaccinated have an allergy to a component   of the vaccine?   No  Egg Allergy Algorithm Link    3. Has the person to be vaccinated ever had a serious reaction   to influenza vaccine in the past?   No    4. Has the person to be vaccinated ever had Guillain-Barré syndrome?   No    Form completed by Dago Kelsey [facility]            Injectable Influenza Immunization Documentation    1.  Is the person to be vaccinated sick today?   No    2. Does the person to be vaccinated have an allergy to a component   of the vaccine?   No  Egg Allergy Algorithm Link    3. Has the person to be vaccinated ever had a serious reaction   to influenza vaccine in the past?   No    4. Has the person to be vaccinated ever had Guillain-Barré syndrome?   No    Form completed by Dago Kelsey [facility]            Injectable Influenza Immunization Documentation    1.  Is the person to be vaccinated sick today?   No    2. Does the person to be vaccinated have an allergy to a component   of the vaccine?   No  Egg Allergy Algorithm Link    3. Has the person to be vaccinated ever had a serious reaction   to influenza vaccine in the past?   No    4. Has the person to be vaccinated ever had Guillain-Barré syndrome?   No    Form completed by Dago Kelsey [facility]

## 2017-12-10 LAB — LAB SCANNED RESULT: NORMAL

## 2018-01-05 ENCOUNTER — OFFICE VISIT (OUTPATIENT)
Dept: MATERNAL FETAL MEDICINE | Facility: CLINIC | Age: 33
End: 2018-01-05
Attending: ADVANCED PRACTICE MIDWIFE
Payer: MEDICAID

## 2018-01-05 ENCOUNTER — PRENATAL OFFICE VISIT (OUTPATIENT)
Dept: OBGYN | Facility: CLINIC | Age: 33
End: 2018-01-05
Payer: COMMERCIAL

## 2018-01-05 ENCOUNTER — HOSPITAL ENCOUNTER (OUTPATIENT)
Dept: ULTRASOUND IMAGING | Facility: CLINIC | Age: 33
Discharge: HOME | End: 2018-01-05
Attending: ADVANCED PRACTICE MIDWIFE | Admitting: ADVANCED PRACTICE MIDWIFE
Payer: MEDICAID

## 2018-01-05 VITALS
HEART RATE: 88 BPM | BODY MASS INDEX: 25.39 KG/M2 | WEIGHT: 167 LBS | DIASTOLIC BLOOD PRESSURE: 62 MMHG | SYSTOLIC BLOOD PRESSURE: 118 MMHG

## 2018-01-05 DIAGNOSIS — Z28.39 RUBELLA NON-IMMUNE STATUS, ANTEPARTUM: ICD-10-CM

## 2018-01-05 DIAGNOSIS — Z34.82 ENCOUNTER FOR SUPERVISION OF OTHER NORMAL PREGNANCY IN SECOND TRIMESTER: Primary | ICD-10-CM

## 2018-01-05 DIAGNOSIS — O26.90 PREGNANCY RELATED CONDITION, ANTEPARTUM: ICD-10-CM

## 2018-01-05 DIAGNOSIS — O09.899 RUBELLA NON-IMMUNE STATUS, ANTEPARTUM: ICD-10-CM

## 2018-01-05 DIAGNOSIS — Z36.89 ENCOUNTER FOR FETAL ANATOMIC SURVEY: Primary | ICD-10-CM

## 2018-01-05 PROCEDURE — 99207 ZZC PRENATAL VISIT: CPT | Performed by: ADVANCED PRACTICE MIDWIFE

## 2018-01-05 PROCEDURE — 76805 OB US >/= 14 WKS SNGL FETUS: CPT

## 2018-01-05 NOTE — MR AVS SNAPSHOT
After Visit Summary   1/5/2018    Ernie Catherine    MRN: [id number]           Patient Information     Date Of Birth          1985        Visit Information        Provider Department      1/5/2018 10:00 AM Netta Tavares, DO [facility] Maternal Fetal Medicine [facility]        Today's Diagnoses     Encounter for fetal anatomic survey    -  1       Follow-ups after your visit        Your next 10 appointments already scheduled     Jan 05, 2018 10:30 AM CST   ESTABLISHED PRENATAL with KELLY Francisco CNM   [facility] ([facility])    [address] [place]  [facility] [address]   [phone]            Feb 02, 2018  9:30 AM CST   ESTABLISHED PRENATAL with KELLY Francisco CNM   [facility] ([facility])    [address]  [facility] [address]   [phone]            Mar 02, 2018  9:30 AM CST   ESTABLISHED PRENATAL with KELLY Francisco CNM   [facility] ([facility])    [address]  [facility] [address]   [phone]              Future tests that were ordered for you today     Open Future Orders        Priority Expected Expires Ordered    Maternal Fetal OB Complete 2/3 Tri Sngle Routine  9/15/2018 11/15/2017            Who to contact     If you have questions or need follow up information about today's clinic visit or your schedule please contact [facility] MATERNAL FETAL MEDIC[facility] directly at [phone].  Normal or non-critical lab and imaging results will be communicated to you by MyChart, letter or phone within 4 business days after the clinic has received the results. If you do not hear from us within 7 days, please contact the clinic through [employer] or phone. If you have a critical or abnormal lab result, we will notify you by phone as soon as possible.  Submit refill requests through [employer] or call your pharmacy and they will forward the  "refill request to us. Please allow 3 business days for your refill to be completed.          Additional Information About Your Visit        MyChart Information     [employer] lets you send messages to your doctor, view your test results, renew your prescriptions, schedule appointments and more. To sign up, go to [email or web address] . Click on \"Log in\" on the left side of the screen, which will take you to the Welcome page. Then click on \"Sign up Now\" on the right side of the page.     You will be asked to enter the access code listed below, as well as some personal information. Please follow the directions to create your username and password.     Your access code is: [email or web address]  Expires: 2018 11:14 AM     Your access code will  in 90 days. If you need help or a new code, please call your [place] clinic or [phone].        Care EveryWhere ID     This is your Care EveryWhere ID. This could be used by other organizations to access your [place] medical records  [id number]        Your Vitals Were     Last Period                   2017 (Exact Date)            Blood Pressure from Last 3 Encounters:   17 120/76   11/10/17 120/68   16 124/68    Weight from Last 3 Encounters:   17 73 kg (160 lb 14.4 oz)   11/10/17 75.3 kg (166 lb)   16 79.4 kg (175 lb)              Today, you had the following     No orders found for display       Primary Care Provider Office Phone # Fax #    Kacy Olivo [phone]       [place] [facility] [address] [address]        Equal Access to Services     [facility] : Hadii marisela Joiner, mikalda eladia, qaybta roseyalveronique haas. So [facility] [phone].    ATENCIÓN: Si habla español, tiene a farrell disposición servicios gratuitos de asistencia lingüística. Llame al [phone].    We comply with applicable federal civil rights laws and [place] laws. We do " not discriminate on the basis of race, color, national origin, age, disability, sex, sexual orientation, or gender identity.            Thank you!     Thank you for choosing [facility]  for your care. Our goal is always to provide you with excellent care. Hearing back from our patients is one way we can continue to improve our services. Please take a few minutes to complete the written survey that you may receive in the mail after your visit with us. Thank you!             Your Updated Medication List - Protect others around you: Learn how to safely use, store and throw away your medicines at www.disposemymeds.org.          This list is accurate as of: 1/5/18 10:28 AM.  Always use your most recent med list.                   Brand Name Dispense Instructions for use Diagnosis    BENADRYL PO      Take by mouth nightly as needed        prenatal multivitamin plus iron 27-0.8 MG Tabs per tablet      Take 1 tablet by mouth daily        VITAMIN B6 PO

## 2018-01-05 NOTE — NURSING NOTE
"Chief Complaint   Patient presents with     Prenatal Care     18 weeks 1 day- concerns of emilee ward        Initial /62 (BP Location: Right arm, Patient Position: Sitting, Cuff Size: Adult Regular)  Pulse 88  Wt 167 lb (75.8 kg)  LMP 2017 (Exact Date)  Breastfeeding? No  BMI 25.39 kg/m2 Estimated body mass index is 25.39 kg/(m^2) as calculated from the following:    Height as of 11/10/17: 5' 8\" (1.727 m).    Weight as of this encounter: 167 lb (75.8 kg).  BP completed using cuff size: regular        The following HM Due: NONE    patient has appointment for today.    +emilee ward    18w1d  Dago Kelsey CMA                 "

## 2018-01-05 NOTE — PROGRESS NOTES
Ernie Catherine is here alone today for a routine prenatal visit at 18w1d.  She has has no specific complaints and has been feeling well.. First trimester screeningresults reviewed.  Had Level 2 ultrasound at [facility] today, normal results.  Appetite is normal. Interval weight gain is appropriate.    Anticipatory guidance, warning signs, when to call and CNM contact information reviewed.

## 2018-01-05 NOTE — MR AVS SNAPSHOT
After Visit Summary   1/5/2018    Ernie Catherine    MRN: [id number]           Patient Information     Date Of Birth          1985        Visit Information        Provider Department      1/5/2018 10:30 AM Gayle Arce APRN CNM [facility]        Today's Diagnoses     Encounter for supervision of other normal pregnancy in second trimester    -  1    Rubella non-immune status, antepartum           Follow-ups after your visit        Follow-up notes from your care team     Return in about 4 weeks (around 2/2/2018).      Your next 10 appointments already scheduled     Feb 02, 2018  9:30 AM CST   ESTABLISHED PRENATAL with KELLY Francisco CNM   [facility] ([facility])    [address]  [facility] [id number] [phone]            Mar 02, 2018  9:30 AM CST   ESTABLISHED PRENATAL with KELLY Francisco CNM   [facility] ([facility])    [address]  [facility] [address] [phone]              Future tests that were ordered for you today     Open Future Orders        Priority Expected Expires Ordered    Maternal Fetal OB Complete 2/3 Tri Sngle Routine  9/15/2018 11/15/2017            Who to contact     If you have questions or need follow up information about today's clinic visit or your schedule please contact [facility] directly at [phone].  Normal or non-critical lab and imaging results will be communicated to you by MyChart, letter or phone within 4 business days after the clinic has received the results. If you do not hear from us within 7 days, please contact the clinic through MyChart or phone. If you have a critical or abnormal lab result, we will notify you by phone as soon as possible.  Submit refill requests through [employer] or call your pharmacy and they will forward the refill request to us. Please allow 3 business days for your refill to be  "completed.          Additional Information About Your Visit        [employer] Information     [employer] lets you send messages to your doctor, view your test results, renew your prescriptions, schedule appointments and more. To sign up, go to [email or web address] . Click on \"Log in\" on the left side of the screen, which will take you to the Welcome page. Then click on \"Sign up Now\" on the right side of the page.     You will be asked to enter the access code listed below, as well as some personal information. Please follow the directions to create your username and password.     Your access code is: [email or web address]  Expires: 2018 11:14 AM     Your access code will  in 90 days. If you need help or a new code, please call your [place] clinic or [phone].        Care EveryWhere ID     This is your Care EveryWhere ID. This could be used by other organizations to access your [place] medical records  [id number]        Your Vitals Were     Pulse Last Period Breastfeeding? BMI (Body Mass Index)          88 2017 (Exact Date) No 25.39 kg/m2         Blood Pressure from Last 3 Encounters:   18 118/62   17 120/76   11/10/17 120/68    Weight from Last 3 Encounters:   18 167 lb (75.8 kg)   17 160 lb 14.4 oz (73 kg)   11/10/17 166 lb (75.3 kg)              Today, you had the following     No orders found for display       Primary Care Provider Office Phone # Fax #    Kacy Olivo [phone]       [place] [facility] [address] [address]        Equal Access to Services     [facility] : Hadii marisela Joiner, wasabrinada luamy, qaybta kaalmaveronique childress. So [facility] [phone].    ATENCIÓN: Si habla español, tiene a farrell disposición servicios gratuitos de asistencia lingüística. Llame al [phone].    We comply with applicable federal civil rights laws and [place] laws. We do not discriminate on the " basis of race, color, national origin, age, disability, sex, sexual orientation, or gender identity.            Thank you!     Thank you for choosing [facility]  for your care. Our goal is always to provide you with excellent care. Hearing back from our patients is one way we can continue to improve our services. Please take a few minutes to complete the written survey that you may receive in the mail after your visit with us. Thank you!             Your Updated Medication List - Protect others around you: Learn how to safely use, store and throw away your medicines at www.disposemymeds.org.          This list is accurate as of: 1/5/18 10:48 AM.  Always use your most recent med list.                   Brand Name Dispense Instructions for use Diagnosis    BENADRYL PO      Take by mouth nightly as needed        prenatal multivitamin plus iron 27-0.8 MG Tabs per tablet      Take 1 tablet by mouth daily        VITAMIN B6 PO

## 2018-01-05 NOTE — PROGRESS NOTES
"Please see \"Imaging\" tab under \"Chart Review\" for details of today's US.      Netta Tavares, DO  Maternal-Fetal Medicine        "

## 2018-02-02 ENCOUNTER — PRENATAL OFFICE VISIT (OUTPATIENT)
Dept: OBGYN | Facility: CLINIC | Age: 33
End: 2018-02-02
Payer: MEDICAID

## 2018-02-02 VITALS
SYSTOLIC BLOOD PRESSURE: 98 MMHG | WEIGHT: 171 LBS | DIASTOLIC BLOOD PRESSURE: 60 MMHG | BODY MASS INDEX: 26 KG/M2 | HEART RATE: 78 BPM

## 2018-02-02 DIAGNOSIS — Z34.82 ENCOUNTER FOR SUPERVISION OF OTHER NORMAL PREGNANCY IN SECOND TRIMESTER: Primary | ICD-10-CM

## 2018-02-02 PROCEDURE — 99207 ZZC PRENATAL VISIT: CPT | Performed by: ADVANCED PRACTICE MIDWIFE

## 2018-02-02 NOTE — MR AVS SNAPSHOT
"              After Visit Summary   2/2/2018    Ernie Catherine    MRN: [id number]           Patient Information     Date Of Birth          1985        Visit Information        Provider Department      2/2/2018 9:30 AM Gayle Arce APRN CNM [facility]        Today's Diagnoses     Encounter for supervision of other normal pregnancy in second trimester    -  1       Follow-ups after your visit        Follow-up notes from your care team     Return in about 4 weeks (around 3/2/2018).      Your next 10 appointments already scheduled     Mar 02, 2018  9:30 AM CST   ESTABLISHED PRENATAL with KELLY Francisco CNM   [facility] ([facility]    [address]  [facility] [address] [phone]              Who to contact     If you have questions or need follow up information about today's clinic visit or your schedule please contact [facility] directly at [phone].  Normal or non-critical lab and imaging results will be communicated to you by MyChart, letter or phone within 4 business days after the clinic has received the results. If you do not hear from us within 7 days, please contact the clinic through [employer] or phone. If you have a critical or abnormal lab result, we will notify you by phone as soon as possible.  Submit refill requests through [employer] or call your pharmacy and they will forward the refill request to us. Please allow 3 business days for your refill to be completed.          Additional Information About Your Visit        [employer] Information     [employer] lets you send messages to your doctor, view your test results, renew your prescriptions, schedule appointments and more. To sign up, go to [email or web address] . Click on \"Log in\" on the left side of the screen, which will take you to the Welcome page. Then click on \"Sign up Now\" on the right side of the page.     You will be asked to enter the access " code listed below, as well as some personal information. Please follow the directions to create your username and password.     Your access code is: [email or web address]  Expires: 2018 11:14 AM     Your access code will  in 90 days. If you need help or a new code, please call your [place] clinic or [phone].        Care EveryWhere ID     This is your Care EveryWhere ID. This could be used by other organizations to access your [facility] medical records  [id number]        Your Vitals Were     Pulse Last Period Breastfeeding? BMI (Body Mass Index)          78 2017 (Exact Date) No 26 kg/m2         Blood Pressure from Last 3 Encounters:   18 98/60   18 118/62   17 120/76    Weight from Last 3 Encounters:   18 171 lb (77.6 kg)   18 167 lb (75.8 kg)   17 160 lb 14.4 oz (73 kg)              Today, you had the following     No orders found for display       Primary Care Provider Office Phone # Fax #    Kacy Olivo [phone]       [place] [address]        Equal Access to Services     WINNIE ORELLANA : Hadii aad ku hadasho Soomaali, waaxda luqadaha, qaybta kaalmada adeegyada, waxay idiin haychristianon alize zheng lashakila . So [facility] [phone].    ATENCIÓN: Si habla español, tiene a farrell disposición servicios gratuitos de asistencia lingüística. Llame al [phone].    We comply with applicable federal civil rights laws and [place] laws. We do not discriminate on the basis of race, color, national origin, age, disability, sex, sexual orientation, or gender identity.            Thank you!     Thank you for choosing [facility]  for your care. Our goal is always to provide you with excellent care. Hearing back from our patients is one way we can continue to improve our services. Please take a few minutes to complete the written survey that you may receive in the mail after your visit with us. Thank you!              Your Updated Medication List - Protect others around you: Learn how to safely use, store and throw away your medicines at www.disposemymeds.org.          This list is accurate as of 2/2/18  9:55 AM.  Always use your most recent med list.                   Brand Name Dispense Instructions for use Diagnosis    BENADRYL PO      Take by mouth nightly as needed        prenatal multivitamin plus iron 27-0.8 MG Tabs per tablet      Take 1 tablet by mouth daily        VITAMIN B6 PO

## 2018-02-02 NOTE — PROGRESS NOTES
Ernie Catherine is here alone for a routine prenatal visit at 22w1d.  She has no concerns and is feeling well.  Happy to be feeling baby move.  She is feeling fetal movement regurally. Appetite is normal. Interval weight gain is appropriate.  Discussed 28 week labs/screening for gestational diabetes, anemia and syphilus are recommended at 26-28 week visit.  Handouts given on glucose testing and TdaP during pregnancy.  Anticipatory guidance, warning signs, when to call and CNM contact information reviewed.

## 2018-02-02 NOTE — NURSING NOTE
"Chief Complaint   Patient presents with     Prenatal Care     22 weeks 1 day- no concerns        Initial BP 98/60 (BP Location: Right arm, Patient Position: Sitting, Cuff Size: Adult Regular)  Pulse 78  Wt 171 lb (77.6 kg)  LMP 2017 (Exact Date)  Breastfeeding? No  BMI 26 kg/m2 Estimated body mass index is 26 kg/(m^2) as calculated from the following:    Height as of 11/10/17: 5' 8\" (1.727 m).    Weight as of this encounter: 171 lb (77.6 kg).  BP completed using cuff size: regular        The following HM Due: NONE      patient has appointment for today.  22w1d  Dago Kelsey CMA                 "

## 2018-03-02 ENCOUNTER — PRENATAL OFFICE VISIT (OUTPATIENT)
Dept: OBGYN | Facility: CLINIC | Age: 33
End: 2018-03-02
Payer: COMMERCIAL

## 2018-03-02 VITALS
DIASTOLIC BLOOD PRESSURE: 70 MMHG | HEART RATE: 100 BPM | BODY MASS INDEX: 26.76 KG/M2 | WEIGHT: 176 LBS | SYSTOLIC BLOOD PRESSURE: 112 MMHG

## 2018-03-02 DIAGNOSIS — Z34.82 ENCOUNTER FOR SUPERVISION OF OTHER NORMAL PREGNANCY IN SECOND TRIMESTER: Primary | ICD-10-CM

## 2018-03-02 DIAGNOSIS — O09.899 RUBELLA NON-IMMUNE STATUS, ANTEPARTUM: ICD-10-CM

## 2018-03-02 DIAGNOSIS — Z28.39 RUBELLA NON-IMMUNE STATUS, ANTEPARTUM: ICD-10-CM

## 2018-03-02 LAB
ERYTHROCYTE [DISTWIDTH] IN BLOOD BY AUTOMATED COUNT: 13 % (ref 10–15)
HCT VFR BLD AUTO: 37 % (ref 35–47)
HGB BLD-MCNC: 12.5 G/DL (ref 11.7–15.7)
MCH RBC QN AUTO: 32 PG (ref 26.5–33)
MCHC RBC AUTO-ENTMCNC: 33.8 G/DL (ref 31.5–36.5)
MCV RBC AUTO: 95 FL (ref 78–100)
PLATELET # BLD AUTO: 192 10E9/L (ref 150–450)
RBC # BLD AUTO: 3.91 10E12/L (ref 3.8–5.2)
WBC # BLD AUTO: 8.8 10E9/L (ref 4–11)

## 2018-03-02 PROCEDURE — 85027 COMPLETE CBC AUTOMATED: CPT | Performed by: ADVANCED PRACTICE MIDWIFE

## 2018-03-02 PROCEDURE — 82950 GLUCOSE TEST: CPT | Performed by: ADVANCED PRACTICE MIDWIFE

## 2018-03-02 PROCEDURE — 86780 TREPONEMA PALLIDUM: CPT | Performed by: ADVANCED PRACTICE MIDWIFE

## 2018-03-02 PROCEDURE — 36415 COLL VENOUS BLD VENIPUNCTURE: CPT | Performed by: ADVANCED PRACTICE MIDWIFE

## 2018-03-02 PROCEDURE — 99207 ZZC PRENATAL VISIT: CPT | Performed by: ADVANCED PRACTICE MIDWIFE

## 2018-03-02 NOTE — NURSING NOTE
"Chief Complaint   Patient presents with     Prenatal Care     26 weeks 1 day- no concerns        Initial /70 (BP Location: Left arm, Patient Position: Sitting, Cuff Size: Adult Regular)  Pulse 100  Wt 176 lb (79.8 kg)  LMP 08/31/2017 (Exact Date)  Breastfeeding? No  BMI 26.76 kg/m2 Estimated body mass index is 26.76 kg/(m^2) as calculated from the following:    Height as of 11/10/17: 5' 8\" (1.727 m).    Weight as of this encounter: 176 lb (79.8 kg).  Medication Reconciliation: complete     +FM  +Kevin ward    26w1d  Dago Kelsey, JENNIFER       "

## 2018-03-02 NOTE — MR AVS SNAPSHOT
After Visit Summary   3/2/2018    Ernie Catherine    MRN: [id number]           Patient Information     Date Of Birth          1985        Visit Information        Provider Department      3/2/2018 9:30 AM Gayle Arce APRN CNM [facility]        Today's Diagnoses     Encounter for supervision of other normal pregnancy in second trimester    -  1    Rubella non-immune status, antepartum           Follow-ups after your visit        Follow-up notes from your care team     Return in about 2 weeks (around 3/16/2018).      Your next 10 appointments already scheduled     Mar 30, 2018  9:30 AM CDT   ESTABLISHED PRENATAL with KELLY Francisco CNM   [facility] ([facility]    [address]  [facility] [id number] [phone]            Apr 17, 2018  9:30 AM CDT   ESTABLISHED PRENATAL with KELLY Francisco CNM   [facility] ([facility])    [address]  [facility] [id number] [phone]              Who to contact     If you have questions or need follow up information about today's clinic visit or your schedule please contact [facility] directly at [phone].  Normal or non-critical lab and imaging results will be communicated to you by MyChart, letter or phone within 4 business days after the clinic has received the results. If you do not hear from us within 7 days, please contact the clinic through [employer] or phone. If you have a critical or abnormal lab result, we will notify you by phone as soon as possible.  Submit refill requests through [employer] or call your pharmacy and they will forward the refill request to us. Please allow 3 business days for your refill to be completed.          Additional Information About Your Visit        [employer] Information     [employer] lets you send messages to your doctor, view your test results, renew your prescriptions,  "schedule appointments and more. To sign up, go to [email or web address] . Click on \"Log in\" on the left side of the screen, which will take you to the Welcome page. Then click on \"Sign up Now\" on the right side of the page.     You will be asked to enter the access code listed below, as well as some personal information. Please follow the directions to create your username and password.     Your access code is: [email or web address]  Expires: 2018  9:58 AM     Your access code will  in 90 days. If you need help or a new code, please call your [place] clinic or [phone].        Care EveryWhere ID     This is your Care EveryWhere ID. This could be used by other organizations to access your [place] medical records  [id number]        Your Vitals Were     Pulse Last Period Breastfeeding? BMI (Body Mass Index)          100 2017 (Exact Date) No 26.76 kg/m2         Blood Pressure from Last 3 Encounters:   18 112/70   18 98/60   18 118/62    Weight from Last 3 Encounters:   18 176 lb (79.8 kg)   18 171 lb (77.6 kg)   18 167 lb (75.8 kg)              We Performed the Following     Anti Treponema     CBC with platelets     Glucose tolerance, gest screen, 1 hour        Primary Care Provider Office Phone # Fax #    Kacy Olivo [phone]       [place] [address] [place] [address]        Equal Access to Services     [facility]: Hadii marisela beltrán hadasho Soanisa, waaxda luqadaha, qaybta kaalmada renate, veronique degroot . So [facility] [phone].    ATENCIÓN: Si habla español, tiene a farrell disposición servicios gratuitos de asistencia lingüística. Llame al [phone].    We comply with applicable federal civil rights laws and [place] laws. We do not discriminate on the basis of race, color, national origin, age, disability, sex, sexual orientation, or gender identity.            Thank you!     Thank you for " choosing [facility]  for your care. Our goal is always to provide you with excellent care. Hearing back from our patients is one way we can continue to improve our services. Please take a few minutes to complete the written survey that you may receive in the mail after your visit with us. Thank you!             Your Updated Medication List - Protect others around you: Learn how to safely use, store and throw away your medicines at www.disposemymeds.org.          This list is accurate as of 3/2/18  9:58 AM.  Always use your most recent med list.                   Brand Name Dispense Instructions for use Diagnosis    BENADRYL PO      Take by mouth nightly as needed        prenatal multivitamin plus iron 27-0.8 MG Tabs per tablet      Take 1 tablet by mouth daily        VITAMIN B6 PO

## 2018-03-02 NOTE — PROGRESS NOTES
Ernie Catherine is here alone for a routine prenatal visit at 26w1d.  She has no concerns and is feeling well.    She is feeling fetal movement regurally. Fetal maturity and expectations for fetal movement in the third trimester.  Interval weight gain is appropriate.  28 week labs (1 hour GCT, CBC, RPR) obtained today.  Risks and benefits of TdaP during pregnancy at 27-36 weeks discussed and will consider at future appointment.  Advised scheduling visits every 2 weeks until ~36 weeks. Anticipatory guidance, warning signs (with emphasis on  labor signs and symptoms), when to call and CNM contact information reviewed.

## 2018-03-02 NOTE — LETTER
[facility]  [address], [address]  [place] [address]  [phone]        March 2, 2018    Ernie Florin  [address] [facility] [address]            Dear Ms. Ernie Catherine:      The results of your recent CBC Panel was NORMAL.    Results for orders placed or performed in visit on 03/02/18   CBC with platelets   Result Value Ref Range    WBC 8.8 4.0 - 11.0 10e9/L    RBC Count 3.91 3.8 - 5.2 10e12/L    Hemoglobin 12.5 11.7 - 15.7 g/dL    Hematocrit 37.0 35.0 - 47.0 %    MCV 95 78 - 100 fl    MCH 32.0 26.5 - 33.0 pg    MCHC 33.8 31.5 - 36.5 g/dL    RDW 13.0 10.0 - 15.0 %    Platelet Count 192 150 - 450 10e9/L     If you have any further questions or problems, please contact our office.    Sincerely,      KELLY Zhao CNM

## 2018-03-03 LAB
GLUCOSE 1H P 50 G GLC PO SERPL-MCNC: 93 MG/DL (ref 60–129)
T PALLIDUM IGG+IGM SER QL: NEGATIVE

## 2018-03-15 ENCOUNTER — PRENATAL OFFICE VISIT (OUTPATIENT)
Dept: OBGYN | Facility: CLINIC | Age: 33
End: 2018-03-15
Payer: COMMERCIAL

## 2018-03-15 VITALS
DIASTOLIC BLOOD PRESSURE: 68 MMHG | SYSTOLIC BLOOD PRESSURE: 128 MMHG | WEIGHT: 180 LBS | BODY MASS INDEX: 27.37 KG/M2 | HEART RATE: 84 BPM

## 2018-03-15 DIAGNOSIS — Z34.80 SUPERVISION OF OTHER NORMAL PREGNANCY, ANTEPARTUM: Primary | ICD-10-CM

## 2018-03-15 PROCEDURE — 90471 IMMUNIZATION ADMIN: CPT | Performed by: ADVANCED PRACTICE MIDWIFE

## 2018-03-15 PROCEDURE — 99207 ZZC PRENATAL VISIT: CPT | Performed by: ADVANCED PRACTICE MIDWIFE

## 2018-03-15 PROCEDURE — 90715 TDAP VACCINE 7 YRS/> IM: CPT | Performed by: ADVANCED PRACTICE MIDWIFE

## 2018-03-15 NOTE — PROGRESS NOTES
Ernie Jimenes is here aone for a routine prenatal visit at 28w0d.    She has no questions or concerns today.  Feeling well.    She is feeling fetal movement regurally. Fetal maturity and expectations for fetal movement in the third trimester.    Appetite is normal. Interval weight gain is appropriate.    Reviewed 28 week lab results.  TdaP given today.   Discussed how to pre-register on our website after 30 weeks. Discussed birth control options after delivery, patient undecided at this time.  Considering Nexplanon or IUD.  Discussed choosing a provider for infant care, patient uses [facility].    Anticipatory guidance, warning signs (with emphasis on  labor signs and symptoms), when to call and CNM contact information reviewed.   Return to Clinic in 2 weeks

## 2018-03-15 NOTE — NURSING NOTE
"Chief Complaint   Patient presents with     Prenatal Care     28 weeks- no concerns        Initial /68 (BP Location: Right arm, Patient Position: Sitting, Cuff Size: Adult Regular)  Pulse 84  Wt 180 lb (81.6 kg)  LMP 08/31/2017 (Exact Date)  Breastfeeding? No  BMI 27.37 kg/m2 Estimated body mass index is 27.37 kg/(m^2) as calculated from the following:    Height as of 11/10/17: 5' 8\" (1.727 m).    Weight as of this encounter: 180 lb (81.6 kg).  Medication Reconciliation: complete     28w0d  Dago Kelsey CMA       "

## 2018-03-15 NOTE — MR AVS SNAPSHOT
After Visit Summary   3/15/2018    Ernie Jimenes    MRN: [id number]           Patient Information     Date Of Birth          1985        Visit Information        Provider Department      3/15/2018 4:00 PM Gayle Arce APRN CNM [facility]        Today's Diagnoses     Supervision of other normal pregnancy, antepartum    -  1       Follow-ups after your visit        Follow-up notes from your care team     Return in about 2 weeks (around 3/29/2018).      Your next 10 appointments already scheduled     Mar 30, 2018  9:30 AM CDT   ESTABL[facility] PRENATAL with KELLY Francisco CN[facility]    [address] [place]  [facility] [id number] [phone]            Apr 17, 2018  9:30 AM CDT   ESTABLISHED PRENATAL with KELLY Francisco CNM   [facility]    [address]  [facility] [id number] [phone]              Who to contact     If you have questions or need follow up information about today's clinic visit or your schedule please contact [facility] directly at [phone].  Normal or non-critical lab and imaging results will be communicated to you by MyChart, letter or phone within 4 business days after the clinic has received the results. If you do not hear from us within 7 days, please contact the clinic through [employer] or phone. If you have a critical or abnormal lab result, we will notify you by phone as soon as possible.  Submit refill requests through [employer] or call your pharmacy and they will forward the refill request to us. Please allow 3 business days for your refill to be completed.          Additional Information About Your Visit        MyChart Information     [employer] lets you send messages to your doctor, view your test results, renew your prescriptions, schedule appointments and more. To sign up, go to [email or web address] .  "Click on \"Log in\" on the left side of the screen, which will take you to the Welcome page. Then click on \"Sign up Now\" on the right side of the page.     You will be asked to enter the access code listed below, as well as some personal information. Please follow the directions to create your username and password.     Your access code is: [email or web address]  Expires: 2018 10:58 AM     Your access code will  in 90 days. If you need help or a new code, please call your [place] clinic or [phone].        Care EveryWhere ID     This is your Care EveryWhere ID. This could be used by other organizations to access your [place] medical records  [id number]        Your Vitals Were     Pulse Last Period Breastfeeding? BMI (Body Mass Index)          84 2017 (Exact Date) No 27.37 kg/m2         Blood Pressure from Last 3 Encounters:   03/15/18 128/68   18 112/70   18 98/60    Weight from Last 3 Encounters:   03/15/18 180 lb (81.6 kg)   18 176 lb (79.8 kg)   18 171 lb (77.6 kg)              We Performed the Following     INJECTION INTRAMUSCULAR OR SUB-Q     TDAP (ADACEL)        Primary Care Provider Office Phone # Fax #    Kcay Olivo [phone]       [place] [address]        Equal Access to Services     [facility]: Hadii marisela ku hadasho Soomaali, waaxda luqadaha, qaybta kaalmada adeegyapeter, veronique degroot . So [facility] [phone].    ATENCIÓN: Si habla español, tiene a farrell disposición servicios gratuitos de asistencia lingüística. Llame al [phone].    We comply with applicable federal civil rights laws and [place] laws. We do not discriminate on the basis of race, color, national origin, age, disability, sex, sexual orientation, or gender identity.            Thank you!     Thank you for choosing [facility]  for your care. Our goal is always to provide you with excellent care. " Hearing back from our patients is one way we can continue to improve our services. Please take a few minutes to complete the written survey that you may receive in the mail after your visit with us. Thank you!             Your Updated Medication List - Protect others around you: Learn how to safely use, store and throw away your medicines at www.disposemymeds.org.          This list is accurate as of 3/15/18  4:36 PM.  Always use your most recent med list.                   Brand Name Dispense Instructions for use Diagnosis    BENADRYL PO      Take by mouth nightly as needed        prenatal multivitamin plus iron 27-0.8 MG Tabs per tablet      Take 1 tablet by mouth daily        VITAMIN B6 PO

## 2018-03-30 ENCOUNTER — PRENATAL OFFICE VISIT (OUTPATIENT)
Dept: OBGYN | Facility: CLINIC | Age: 33
End: 2018-03-30
Payer: COMMERCIAL

## 2018-03-30 VITALS — DIASTOLIC BLOOD PRESSURE: 70 MMHG | BODY MASS INDEX: 27.9 KG/M2 | WEIGHT: 183.5 LBS | SYSTOLIC BLOOD PRESSURE: 122 MMHG

## 2018-03-30 DIAGNOSIS — Z34.80 SUPERVISION OF OTHER NORMAL PREGNANCY, ANTEPARTUM: Primary | ICD-10-CM

## 2018-03-30 PROCEDURE — 99207 ZZC PRENATAL VISIT: CPT | Performed by: ADVANCED PRACTICE MIDWIFE

## 2018-03-30 NOTE — MR AVS SNAPSHOT
After Visit Summary   3/30/2018    Ernie Jimenes    MRN: [id number]           Patient Information     Date Of Birth          1985        Visit Information        Provider Department      3/30/2018 9:30 AM Gayle Arce APRN CNM [facility]        Today's Diagnoses     Supervision of other normal pregnancy, antepartum    -  1       Follow-ups after your visit        Follow-up notes from your care team     Return in about 2 weeks (around 4/13/2018).      Your next 10 appointments already scheduled     Apr 17, 2018  9:30 AM CDT   ESTABLISHED PRENATAL with KELLY Francisco CNM   [facility] ([facility])    [address]  [facility] [address]   [phone]            Apr 30, 2018  9:30 AM CDT   ESTABLISHED PRENATAL with KELLY Francisco CNM   [facility])    [address]  [address]  [place] [address] [phone]            May 21, 2018  9:30 AM CDT   ESTABLISHED PRENATAL with KELLY Francisco CNM   [facility]    [address]  [address]  [place] [id number] [phone]            May 29, 2018  9:30 AM CDT   ESTABLISHED PRENATAL with KELLY Hughes CNM   [facility] ([facility])    [address]  [facility] [address]   [phone]            Jun 04, 2018  9:30 AM CDT   ESTABLISHED PRENATAL with KELLY Francisco CNM   [facility])    [address]  [address]  [place] [address] [phone]              Who to contact     If you have questions or need follow up information about today's clinic visit or your schedule please contact [facility] directly at [phone].  Normal or non-critical lab and imaging results will be communicated to you by MyChart, letter or phone  "within 4 business days after the clinic has received the results. If you do not hear from us within 7 days, please contact the clinic through [employer] or phone. If you have a critical or abnormal lab result, we will notify you by phone as soon as possible.  Submit refill requests through [employer] or call your pharmacy and they will forward the refill request to us. Please allow 3 business days for your refill to be completed.          Additional Information About Your Visit        [employer] Information     [employer] lets you send messages to your doctor, view your test results, renew your prescriptions, schedule appointments and more. To sign up, go to [email or web address] . Click on \"Log in\" on the left side of the screen, which will take you to the Welcome page. Then click on \"Sign up Now\" on the right side of the page.     You will be asked to enter the access code listed below, as well as some personal information. Please follow the directions to create your username and password.     Your access code is: [email or web address]  Expires: 2018 10:58 AM     Your access code will  in 90 days. If you need help or a new code, please call your [place] clinic or [phone].        Care EveryWhere ID     This is your Care EveryWhere ID. This could be used by other organizations to access your [place] medical records  [id number]        Your Vitals Were     Last Period BMI (Body Mass Index)                2017 (Exact Date) 27.9 kg/m2           Blood Pressure from Last 3 Encounters:   18 122/70   03/15/18 128/68   18 112/70    Weight from Last 3 Encounters:   18 183 lb 8 oz (83.2 kg)   03/15/18 180 lb (81.6 kg)   18 176 lb (79.8 kg)              Today, you had the following     No orders found for display       Primary Care Provider Office Phone # Fax #    Kacy Olivo [phone]       [place] [address]        Equal Access to " Services     [facility]: Hadii aad ku hadyarakarina Joiner, wasabrinada luqadaha, qaybta kaalmapeter dewitt, veronique jackson. So [facility] [phone].    ATENCIÓN: Si habla español, tiene a farrell disposición servicios gratuitos de asistencia lingüística. Llame al [phone].    We comply with applicable federal civil rights laws and [place] laws. We do not discriminate on the basis of race, color, national origin, age, disability, sex, sexual orientation, or gender identity.            Thank you!     Thank you for choosing [facility]  for your care. Our goal is always to provide you with excellent care. Hearing back from our patients is one way we can continue to improve our services. Please take a few minutes to complete the written survey that you may receive in the mail after your visit with us. Thank you!             Your Updated Medication List - Protect others around you: Learn how to safely use, store and throw away your medicines at www.disposemymeds.org.          This list is accurate as of 3/30/18 10:03 AM.  Always use your most recent med list.                   Brand Name Dispense Instructions for use Diagnosis    BENADRYL PO      Take by mouth nightly as needed        prenatal multivitamin plus iron 27-0.8 MG Tabs per tablet      Take 1 tablet by mouth daily        VITAMIN B6 PO

## 2018-03-30 NOTE — PROGRESS NOTES
Ernie Jimenes is here with her son for a routine prenatal visit at 30w1d.  She has complatins of increasing back pain from picking up son. Discussed good body mechanics, use of belly band for support.     She is feeling fetal movement regularly. Fetal maturity and expectations for fetal movement in the third trimester.  Appetite is normal. Interval weight gain is appropriate.    Anticipatory guidance, warning signs (with emphasis on  labor signs and symptoms), when to call and CNM contact information reviewed.   Return to Clinic in 2 weeks

## 2018-04-17 ENCOUNTER — PRENATAL OFFICE VISIT (OUTPATIENT)
Dept: OBGYN | Facility: CLINIC | Age: 33
End: 2018-04-17
Payer: COMMERCIAL

## 2018-04-17 VITALS
HEART RATE: 82 BPM | SYSTOLIC BLOOD PRESSURE: 124 MMHG | WEIGHT: 186 LBS | DIASTOLIC BLOOD PRESSURE: 70 MMHG | BODY MASS INDEX: 28.28 KG/M2

## 2018-04-17 DIAGNOSIS — Z34.80 PRENATAL CARE, SUBSEQUENT PREGNANCY, UNSPECIFIED TRIMESTER: Primary | ICD-10-CM

## 2018-04-17 DIAGNOSIS — O09.899 RUBELLA NON-IMMUNE STATUS, ANTEPARTUM: ICD-10-CM

## 2018-04-17 DIAGNOSIS — Z28.39 RUBELLA NON-IMMUNE STATUS, ANTEPARTUM: ICD-10-CM

## 2018-04-17 PROCEDURE — 99207 ZZC PRENATAL VISIT: CPT | Performed by: ADVANCED PRACTICE MIDWIFE

## 2018-04-17 NOTE — MR AVS SNAPSHOT
After Visit Summary   4/17/2018    Ernie Jimenes    MRN: [id number]           Patient Information     Date Of Birth          1985        Visit Information        Provider Department      4/17/2018 9:30 AM Gayle Arce APRN CNM [facility]        Today's Diagnoses     Prenatal care, subsequent pregnancy, unspecified trimester    -  1    Rubella non-immune status, antepartum           Follow-ups after your visit        Follow-up notes from your care team     Return in about 2 weeks (around 5/1/2018) for Routine Visit.      Your next 10 appointments already scheduled     Apr 30, 2018  9:30 AM CDT   ESTABLISHED PRENATAL with KELLY Francisco CNM   [facility] [place] [facility]    [address]  [address]  [place] [address]   [phone]            May 21, 2018  9:30 AM CDT   ESTABLISHED PRENATAL with KELLY Francisco CNM   [facility] [place] ([facility])    [address]  [address]  [place] [address]   [phone]            May 29, 2018  9:30 AM CDT   ESTABLISHED PRENATAL with KELLY Hughes CNM   [facility] [place] ([facility])    [address]  [address]  [place] [address]   [phone]            Jun 04, 2018  9:30 AM CDT   ESTABLISHED PRENATAL with KELLY Francisco CNM   [facility] [place] [facility]    [address]  [address]  [place] [address] [phone]              Who to contact     If you have questions or need follow up information about today's clinic visit or your schedule please contact [facility] directly at [phone].  Normal or non-critical lab and imaging results will be communicated to you by MyChart, letter or phone within 4 business days after the clinic has received the results. If you do not hear from us within 7 days, please contact the clinic through  ""[employer]hart or phone. If you have a critical or abnormal lab result, we will notify you by phone as soon as possible.  Submit refill requests through [employer] or call your pharmacy and they will forward the refill request to us. Please allow 3 business days for your refill to be completed.          Additional Information About Your Visit        "[employer] Information     [employer] lets you send messages to your doctor, view your test results, renew your prescriptions, schedule appointments and more. To sign up, go to [email or web address] . Click on \"Log in\" on the left side of the screen, which will take you to the Welcome page. Then click on \"Sign up Now\" on the right side of the page.     You will be asked to enter the access code listed below, as well as some personal information. Please follow the directions to create your username and password.     Your access code is: [email or web address]  Expires: 2018 10:58 AM     Your access code will  in 90 days. If you need help or a new code, please call your [place] clinic or [phone].        Care EveryWhere ID     This is your Care EveryWhere ID. This could be used by other organizations to access your [place] medical records  [id number]        Your Vitals Were     Pulse Last Period Breastfeeding? BMI (Body Mass Index)          82 2017 (Exact Date) No 28.28 kg/m2         Blood Pressure from Last 3 Encounters:   18 124/70   18 122/70   03/15/18 128/68    Weight from Last 3 Encounters:   18 186 lb (84.4 kg)   18 183 lb 8 oz (83.2 kg)   03/15/18 180 lb (81.6 kg)              Today, you had the following     No orders found for display       Primary Care Provider Office Phone # Fax #    Kacy Olivo [phone]       [facility] [place] [address] [place] [address]        Equal Access to Services     WINNIE ORELLANA AH: Naseem Joiner, logan montilla, qaybta veronique vail " alize jimeneznkechithor gillespieaakasia ah. So [facility] [place] [phone].    ATENCIÓN: Si habla lori, tiene a farrell disposición servicios gratuitos de asistencia lingüística. Phuong al [phone].    We comply with applicable federal civil rights laws and [place] laws. We do not discriminate on the basis of race, color, national origin, age, disability, sex, sexual orientation, or gender identity.            Thank you!     Thank you for choosing [facility]  for your care. Our goal is always to provide you with excellent care. Hearing back from our patients is one way we can continue to improve our services. Please take a few minutes to complete the written survey that you may receive in the mail after your visit with us. Thank you!             Your Updated Medication List - Protect others around you: Learn how to safely use, store and throw away your medicines at www.disposemymeds.org.          This list is accurate as of 4/17/18  9:59 AM.  Always use your most recent med list.                   Brand Name Dispense Instructions for use Diagnosis    BENADRYL PO      Take by mouth nightly as needed        prenatal multivitamin plus iron 27-0.8 MG Tabs per tablet      Take 1 tablet by mouth daily        VITAMIN B6 PO

## 2018-04-17 NOTE — NURSING NOTE
"Chief Complaint   Patient presents with     Prenatal Care     32 weeks 5 days- no concerns        Initial /70 (BP Location: Right arm, Patient Position: Sitting, Cuff Size: Adult Regular)  Pulse 82  Wt 186 lb (84.4 kg)  LMP 08/31/2017 (Exact Date)  Breastfeeding? No  BMI 28.28 kg/m2 Estimated body mass index is 28.28 kg/(m^2) as calculated from the following:    Height as of 11/10/17: 5' 8\" (1.727 m).    Weight as of this encounter: 186 lb (84.4 kg).  Medication Reconciliation: complete     +FM  +[place] ward  +Edema in feet    32w5d  Dago Kelsey CMA       "

## 2018-04-30 ENCOUNTER — PRENATAL OFFICE VISIT (OUTPATIENT)
Dept: OBGYN | Facility: CLINIC | Age: 33
End: 2018-04-30
Payer: COMMERCIAL

## 2018-04-30 VITALS
HEART RATE: 81 BPM | BODY MASS INDEX: 28.89 KG/M2 | DIASTOLIC BLOOD PRESSURE: 80 MMHG | SYSTOLIC BLOOD PRESSURE: 102 MMHG | WEIGHT: 190 LBS

## 2018-04-30 DIAGNOSIS — R35.0 URINARY FREQUENCY: ICD-10-CM

## 2018-04-30 DIAGNOSIS — Z34.90 NORMAL PREGNANCY, ANTEPARTUM: Primary | ICD-10-CM

## 2018-04-30 LAB
ALBUMIN UR-MCNC: NEGATIVE MG/DL
APPEARANCE UR: CLEAR
BACTERIA #/AREA URNS HPF: ABNORMAL /HPF
BILIRUB UR QL STRIP: NEGATIVE
COLOR UR AUTO: YELLOW
GLUCOSE UR STRIP-MCNC: NEGATIVE MG/DL
HGB UR QL STRIP: NEGATIVE
KETONES UR STRIP-MCNC: NEGATIVE MG/DL
LEUKOCYTE ESTERASE UR QL STRIP: ABNORMAL
NITRATE UR QL: NEGATIVE
NON-SQ EPI CELLS #/AREA URNS LPF: ABNORMAL /LPF
PH UR STRIP: 8 PH (ref 5–7)
RBC #/AREA URNS AUTO: ABNORMAL /HPF
SOURCE: ABNORMAL
SP GR UR STRIP: 1.01 (ref 1–1.03)
UROBILINOGEN UR STRIP-ACNC: 0.2 EU/DL (ref 0.2–1)
WBC #/AREA URNS AUTO: ABNORMAL /HPF

## 2018-04-30 PROCEDURE — 81001 URINALYSIS AUTO W/SCOPE: CPT | Performed by: ADVANCED PRACTICE MIDWIFE

## 2018-04-30 PROCEDURE — 99207 ZZC PRENATAL VISIT: CPT | Performed by: ADVANCED PRACTICE MIDWIFE

## 2018-04-30 PROCEDURE — 87086 URINE CULTURE/COLONY COUNT: CPT | Performed by: ADVANCED PRACTICE MIDWIFE

## 2018-04-30 NOTE — NURSING NOTE
"Chief Complaint   Patient presents with     Prenatal Care     34 weeks 4 days- no concerns        Initial /80 (BP Location: Right arm, Patient Position: Sitting, Cuff Size: Adult Regular)  Pulse 81  Wt 190 lb (86.2 kg)  LMP 08/31/2017 (Exact Date)  BMI 28.89 kg/m2 Estimated body mass index is 28.89 kg/(m^2) as calculated from the following:    Height as of 11/10/17: 5' 8\" (1.727 m).    Weight as of this encounter: 190 lb (86.2 kg).  Medication Reconciliation: complete     +FM  +[place] ward    34w4d  Dago Kelsey, JENNIFER       "

## 2018-04-30 NOTE — PROGRESS NOTES
Ernie Jimenes is here with her son for a routine prenatal visit at 34w4d.  She has been feeling well but noticing an increase in urinary frequency.  Denies any burring or incomplete emptying of her bladder.  UA and UC today.   Fetal movement is normal. Interval weight gain is appropriate. .    No concerns about PPD; identifies a good support system.  Reviewed Group B strep vaginal & rectal culture and hemoglobin at 36 week visit.      Anticipatory guidance, warning signs (with emphasis on  labor signs and symptoms), when to call and CNM contact information reviewed.   Return to Clinic in 2 weeks.

## 2018-04-30 NOTE — MR AVS SNAPSHOT
After Visit Summary   4/30/2018    Ernie Jimenes    MRN: [id number]           Patient Information     Date Of Birth          1985        Visit Information        Provider Department      4/30/2018 9:30 AM Gayle Arce APRN CNM [facility] [place]        Today's Diagnoses     Normal pregnancy, antepartum    -  1    Urinary frequency           Follow-ups after your visit        Follow-up notes from your care team     Return in about 2 weeks (around 5/14/2018).      Your next 10 appointments already scheduled     May 15, 2018  9:45 AM CDT   ESTABLISHED PRENATAL with KELLY Francisco CNM   [facility] [place] ([facility])    [address]  [address]  [place] [address] [phone]            May 21, 2018  9:30 AM CDT   ESTABLISHED PRENATAL with KELLY Francisco CNM   [facility] [place] ([facility])    [address]  [address]  [place] [address] [phone]            May 29, 2018  9:30 AM CDT   ESTABLISHED PRENATAL with KELLY Hughes CNM   [facility] [place] ([facility])    [address]  [address]  [place] [address] [phone]            Jun 04, 2018  9:30 AM CDT   ESTABLISHED PRENATAL with KELLY Francisco CNM   [facility] [place] ([facility])    [address]  [address]  [place] [address] [phone]              Who to contact     If you have questions or need follow up information about today's clinic visit or your schedule please contact [facility] directly at [phone].  Normal or non-critical lab and imaging results will be communicated to you by MyChart, letter or phone within 4 business days after the clinic has received the results. If you do not hear from us within 7 days, please contact the clinic through MyChart or phone. If you have a critical or abnormal lab result, we will  "notify you by phone as soon as possible.  Submit refill requests through [employer] or call your pharmacy and they will forward the refill request to us. Please allow 3 business days for your refill to be completed.          Additional Information About Your Visit        [employer] Information     [employer] lets you send messages to your doctor, view your test results, renew your prescriptions, schedule appointments and more. To sign up, go to [email or web address] . Click on \"Log in\" on the left side of the screen, which will take you to the Welcome page. Then click on \"Sign up Now\" on the right side of the page.     You will be asked to enter the access code listed below, as well as some personal information. Please follow the directions to create your username and password.     Your access code is: [email or web address]  Expires: 2018 10:58 AM     Your access code will  in 90 days. If you need help or a new code, please call your [place] clinic or [phone].        Care EveryWhere ID     This is your Care EveryWhere ID. This could be used by other organizations to access your [place] medical records  [id number]        Your Vitals Were     Pulse Last Period BMI (Body Mass Index)             81 2017 (Exact Date) 28.89 kg/m2          Blood Pressure from Last 3 Encounters:   18 102/80   18 124/70   18 122/70    Weight from Last 3 Encounters:   18 190 lb (86.2 kg)   18 186 lb (84.4 kg)   18 183 lb 8 oz (83.2 kg)              We Performed the Following     UA with Microscopic     Urine Culture Aerobic Bacterial        Primary Care Provider Office Phone # Fax #    Kacy Olivo [phone]       [facility] [place] [address]        Equal Access to Services     WINNIE ORELLANA : Naseem Joiner, logan lopezqjean, qaveronique madden. [facility] [phone].    ATENCIÓN: " Si habla lori, tiene a farrell disposición servicios gratuitos de asistencia lingüística. Phuong georges [phone].    We comply with applicable federal civil rights laws and [place] laws. We do not discriminate on the basis of race, color, national origin, age, disability, sex, sexual orientation, or gender identity.            Thank you!     Thank you for choosing [facility]  for your care. Our goal is always to provide you with excellent care. Hearing back from our patients is one way we can continue to improve our services. Please take a few minutes to complete the written survey that you may receive in the mail after your visit with us. Thank you!             Your Updated Medication List - Protect others around you: Learn how to safely use, store and throw away your medicines at www.disposemymeds.org.          This list is accurate as of 4/30/18  9:50 AM.  Always use your most recent med list.                   Brand Name Dispense Instructions for use Diagnosis    BENADRYL PO      Take by mouth nightly as needed        prenatal multivitamin plus iron 27-0.8 MG Tabs per tablet      Take 1 tablet by mouth daily        VITAMIN B6 PO

## 2018-05-01 LAB
BACTERIA SPEC CULT: NORMAL
SPECIMEN SOURCE: NORMAL

## 2018-05-15 ENCOUNTER — PRENATAL OFFICE VISIT (OUTPATIENT)
Dept: OBGYN | Facility: CLINIC | Age: 33
End: 2018-05-15
Payer: COMMERCIAL

## 2018-05-15 VITALS
WEIGHT: 195 LBS | BODY MASS INDEX: 29.55 KG/M2 | DIASTOLIC BLOOD PRESSURE: 70 MMHG | SYSTOLIC BLOOD PRESSURE: 124 MMHG | HEIGHT: 68 IN

## 2018-05-15 DIAGNOSIS — Z34.80 SUPERVISION OF OTHER NORMAL PREGNANCY, ANTEPARTUM: Primary | ICD-10-CM

## 2018-05-15 PROCEDURE — 87653 STREP B DNA AMP PROBE: CPT | Performed by: ADVANCED PRACTICE MIDWIFE

## 2018-05-15 PROCEDURE — 99207 ZZC PRENATAL VISIT: CPT | Performed by: ADVANCED PRACTICE MIDWIFE

## 2018-05-15 NOTE — PROGRESS NOTES
Ernie Jimenes is here alone for a routine prenatal visit at 36w5d.  She has no concerns or questions today.  Denies any regular contractions, leakage of fluid or vaginal bleeding.    Fetal movement is regular. Interval weight gain is appropriate. Group B strep discussed and obtained today.   Encouraged to schedule weekly visits to/through her EDB.    Anticipatory guidance, signs of symptoms of labor or SROM, third trimester warning signs, when to call and CNM contact information reviewed.   Return to Clinic in 1 week.

## 2018-05-15 NOTE — MR AVS SNAPSHOT
After Visit Summary   5/15/2018    Ernie Jimenes    MRN: [id number]           Patient Information     Date Of Birth          1985        Visit Information        Provider Department      5/15/2018 9:45 AM Gayle Arce APRN [facility]M [facility]        Today's Diagnoses     Supervision of other normal pregnancy, antepartum    -  1       Follow-ups after your visit        Follow-up notes from your care team     Return in about 1 week (around 5/22/2018).      Your next 10 appointments already scheduled     May 21, 2018  9:30 AM CDT   ESTABLISHED PRENATAL with KELLY Francisco CNM   [facility]    [address]  [address]  [place] [address] [phone]            May 29, 2018  9:30 AM CDT   ESTABLISHED PRENATAL with KELLY Hughes CNM   [facility]    [address]  [address]  [place] [address] [phone]            Jun 04, 2018  9:30 AM CDT   ESTABLISHED PRENATAL with KELLY Francisco CNM   [facility] [place] ([facility])    [address]  [address]  [place] [address] [phone]              Who to contact     If you have questions or need follow up information about today's clinic visit or your schedule please contact [facility] directly at [phone].  Normal or non-critical lab and imaging results will be communicated to you by MyChart, letter or phone within 4 business days after the clinic has received the results. If you do not hear from us within 7 days, please contact the clinic through MyChart or phone. If you have a critical or abnormal lab result, we will notify you by phone as soon as possible.  Submit refill requests through [employer] or call your pharmacy and they will forward the refill request to us. Please allow 3 business days for your refill to be completed.          Additional  "Information About Your Visit        MyChart Information     [employer] lets you send messages to your doctor, view your test results, renew your prescriptions, schedule appointments and more. To sign up, go to [email or web address] . Click on \"Log in\" on the left side of the screen, which will take you to the Welcome page. Then click on \"Sign up Now\" on the right side of the page.     You will be asked to enter the access code listed below, as well as some personal information. Please follow the directions to create your username and password.     Your access code is: [email or web address]  Expires: 2018 10:58 AM     Your access code will  in 90 days. If you need help or a new code, please call your [place] clinic or [phone].        Care EveryWhere ID     This is your Care EveryWhere ID. This could be used by other organizations to access your [place] medical records  [id number]        Your Vitals Were     Height Last Period BMI (Body Mass Index)             5' 8\" (1.727 m) 2017 (Exact Date) 29.65 kg/m2          Blood Pressure from Last 3 Encounters:   05/15/18 124/70   18 102/80   18 124/70    Weight from Last 3 Encounters:   05/15/18 195 lb (88.5 kg)   18 190 lb (86.2 kg)   18 186 lb (84.4 kg)              We Performed the Following     Group B strep PCR        Primary Care Provider Office Phone # Fax #    Kacy Olivo [phone]       [place] [address] [address]        Equal Access to Services     [facility] : Hadii marisela Joiner, mikalda eladia, qaybta roseyalmapeter dewitt, veronique jackson. So [facility] [phone].    ATENCIÓN: Si habla español, tiene a farrell disposición servicios gratuitos de asistencia lingüística. Llame al [phone].    We comply with applicable federal civil rights laws and [place] laws. We do not discriminate on the basis of race, color, national origin, age, " disability, sex, sexual orientation, or gender identity.            Thank you!     Thank you for choosing [facility]  for your care. Our goal is always to provide you with excellent care. Hearing back from our patients is one way we can continue to improve our services. Please take a few minutes to complete the written survey that you may receive in the mail after your visit with us. Thank you!             Your Updated Medication List - Protect others around you: Learn how to safely use, store and throw away your medicines at www.disposemymeds.org.          This list is accurate as of 5/15/18 10:13 AM.  Always use your most recent med list.                   Brand Name Dispense Instructions for use Diagnosis    BENADRYL PO      Take by mouth nightly as needed        prenatal multivitamin plus iron 27-0.8 MG Tabs per tablet      Take 1 tablet by mouth daily        VITAMIN B6 PO

## 2018-05-15 NOTE — NURSING NOTE
"Chief Complaint   Patient presents with     Prenatal Care       Initial /70  Ht 5' 8\" (1.727 m)  Wt 195 lb (88.5 kg)  LMP 2017 (Exact Date)  BMI 29.65 kg/m2 Estimated body mass index is 29.65 kg/(m^2) as calculated from the following:    Height as of this encounter: 5' 8\" (1.727 m).    Weight as of this encounter: 195 lb (88.5 kg).  BP completed using cuff size: regular        36w5d    Willow Nichols CMA               "

## 2018-05-16 LAB
GP B STREP DNA SPEC QL NAA+PROBE: NEGATIVE
SPECIMEN SOURCE: NORMAL

## 2018-05-21 ENCOUNTER — PRENATAL OFFICE VISIT (OUTPATIENT)
Dept: OBGYN | Facility: CLINIC | Age: 33
End: 2018-05-21
Payer: COMMERCIAL

## 2018-05-21 VITALS — WEIGHT: 196 LBS | SYSTOLIC BLOOD PRESSURE: 104 MMHG | BODY MASS INDEX: 29.8 KG/M2 | DIASTOLIC BLOOD PRESSURE: 86 MMHG

## 2018-05-21 DIAGNOSIS — Z34.80 SUPERVISION OF OTHER NORMAL PREGNANCY, ANTEPARTUM: Primary | ICD-10-CM

## 2018-05-21 PROCEDURE — 99207 ZZC PRENATAL VISIT: CPT | Performed by: ADVANCED PRACTICE MIDWIFE

## 2018-05-21 NOTE — PROGRESS NOTES
Ernie Jimenes is here with her son for a routine prenatal visit at 37w4d.  She has no questions or concerns.  Ready for baby to come.  Denies any regular contractions, leakage of fluid or vaginal bleeding.    Fetal movement is regular. Interval weight gain is appropriate. Group B strep was negative.  Encouraged to schedule weekly visits from to/through her EDB.     Anticipatory guidance, signs of symptoms of labor or SROM, third trimester warning signs, when to call and CNM contact information reviewed.   Return to Clinic in 1 week.

## 2018-05-21 NOTE — MR AVS SNAPSHOT
After Visit Summary   5/21/2018    Ernie Jimenes    MRN: [id number]           Patient Information     Date Of Birth          1985        Visit Information        Provider Department      5/21/2018 9:30 AM Gayle Arce APRN [facility]        Today's Diagnoses     Supervision of other normal pregnancy, antepartum    -  1       Follow-ups after your visit        Follow-up notes from your care team     Return in about 1 week (around 5/28/2018).      Your next 10 appointments already scheduled     May 29, 2018  9:30 AM CDT   [facility]    [address]  [address]  [place] [address] [phone]            Jun 04, 2018  9:30 AM CDT   ESTABLISHED PRENATAL with KELLY Francisco CNM   [facility]    [address]  [address]  [facility] [address] [phone]              Who to contact     If you have questions or need follow up information about today's clinic visit or your schedule please contact [facility] directly at [phone].  Normal or non-critical lab and imaging results will be communicated to you by MyChart, letter or phone within 4 business days after the clinic has received the results. If you do not hear from us within 7 days, please contact the clinic through MyChart or phone. If you have a critical or abnormal lab result, we will notify you by phone as soon as possible.  Submit refill requests through [email or web address] or call your pharmacy and they will forward the refill request to us. Please allow 3 business days for your refill to be completed.          Additional Information About Your Visit        MyChart Information     [email or web address] lets you send messages to your doctor, view your test results, renew your prescriptions, schedule appointments and more. To sign up, go to  [email or web address] . Click on \"Log in\" on the left side of the screen, which will take you to the Welcome page. Then click on \"Sign up Now\" on the right side of the page.     You will be asked to enter the access code listed below, as well as some personal information. Please follow the directions to create your username and password.     Your access code is: [email or web address]  Expires: 2018 10:58 AM     Your access code will  in 90 days. If you need help or a new code, please call your [place] clinic or [phone].        Care EveryWhere ID     This is your Care EveryWhere ID. This could be used by other organizations to access your [place] medical records  [id number]        Your Vitals Were     Last Period BMI (Body Mass Index)                2017 (Exact Date) 29.8 kg/m2           Blood Pressure from Last 3 Encounters:   18 104/86   05/15/18 124/70   18 102/80    Weight from Last 3 Encounters:   18 196 lb (88.9 kg)   05/15/18 195 lb (88.5 kg)   18 190 lb (86.2 kg)              Today, you had the following     No orders found for display       Primary Care Provider Office Phone # Fax #    Kacy Olivo [phone]       [place] [address]        Equal Access to Services     [facility]: Hadii marisela ku hadasho Soomaali, waaxda luqadaha, qaybta kaalmada adeegyada, veronique degroot . So [facility] [phone].    ATENCIÓN: Si habla lori, tiene a farrell disposición servicios gratuitos de asistencia lingüística. Llame al [phone].    We comply with applicable federal civil rights laws and [place] laws. We do not discriminate on the basis of race, color, national origin, age, disability, sex, sexual orientation, or gender identity.            Thank you!     Thank you for choosing [facility]  for your care. Our goal is always to provide you with excellent care. Hearing back from " our patients is one way we can continue to improve our services. Please take a few minutes to complete the written survey that you may receive in the mail after your visit with us. Thank you!             Your Updated Medication List - Protect others around you: Learn how to safely use, store and throw away your medicines at www.disposemymeds.org.          This list is accurate as of 5/21/18  9:46 AM.  Always use your most recent med list.                   Brand Name Dispense Instructions for use Diagnosis    BENADRYL PO      Take by mouth nightly as needed        prenatal multivitamin plus iron 27-0.8 MG Tabs per tablet      Take 1 tablet by mouth daily        VITAMIN B6 PO

## 2018-05-21 NOTE — NURSING NOTE
"Chief Complaint   Patient presents with     Prenatal Care       Initial /86  Wt 196 lb (88.9 kg)  LMP 2017 (Exact Date)  BMI 29.8 kg/m2 Estimated body mass index is 29.8 kg/(m^2) as calculated from the following:    Height as of 5/15/18: 5' 8\" (1.727 m).    Weight as of this encounter: 196 lb (88.9 kg).  BP completed using cuff size: regular      37w4d    Willow Nichols CMA               "

## 2018-05-29 ENCOUNTER — PRENATAL OFFICE VISIT (OUTPATIENT)
Dept: OBGYN | Facility: CLINIC | Age: 33
End: 2018-05-29
Payer: COMMERCIAL

## 2018-05-29 VITALS — SYSTOLIC BLOOD PRESSURE: 126 MMHG | BODY MASS INDEX: 29.95 KG/M2 | DIASTOLIC BLOOD PRESSURE: 70 MMHG | WEIGHT: 197 LBS

## 2018-05-29 DIAGNOSIS — O09.899 RUBELLA NON-IMMUNE STATUS, ANTEPARTUM: ICD-10-CM

## 2018-05-29 DIAGNOSIS — Z28.39 RUBELLA NON-IMMUNE STATUS, ANTEPARTUM: ICD-10-CM

## 2018-05-29 DIAGNOSIS — Z34.03 ENCOUNTER FOR SUPERVISION OF NORMAL FIRST PREGNANCY IN THIRD TRIMESTER: Primary | ICD-10-CM

## 2018-05-29 PROCEDURE — 99207 ZZC PRENATAL VISIT: CPT | Performed by: ADVANCED PRACTICE MIDWIFE

## 2018-05-29 NOTE — PROGRESS NOTES
S:   Baby active.  Occasional uterine cramping, denies vaginal bleeding or leaking of fluid. No headache, increase in edema, no epigastric pain.   O: Vitals: /70 (BP Location: Right arm, Patient Position: Sitting, Cuff Size: Adult Regular)  Wt 197 lb (89.4 kg)  LMP 08/31/2017 (Exact Date)  BMI 29.95 kg/m2  BMI= Body mass index is 29.95 kg/(m^2).  Exam:  Constitutional: healthy, alert and no distress  Respiratory: respirations even and unlabored  Gastrointestinal: Abdomen soft, non-tender. Fundus measures appropriate for gestational age. Fetal heart tones 140 heard without difficulty and within normal limits Cephalic per leopold's maneuver.   : Deferred  Psychiatric: mentation appears normal and affect normal/bright  A:  (Z34.03) Encounter for supervision of normal first pregnancy in third trimester  (primary encounter diagnosis)  Comment: wnl   Plan: return to clinic 1 week     P: Labor signs and symptoms discussed, aware of numbers to call  Discussed warning signs of PIH/preeclampsia and patient will monitor.  GBS screen completed. Discussed plans for labor. Discussed Nitrous.   Reviewed counting fetal movement.  Encouraged patient to call with any questions or concerns.  Return to clinic 1 weeks    KELLY Ortega, ANNE MARIE

## 2018-05-29 NOTE — MR AVS SNAPSHOT
After Visit Summary   5/29/2018    Ernie Jimenes    MRN: [id number]           Patient Information     Date Of Birth          1985        Visit Information        Provider Department      5/29/2018 9:30 AM Charity Sanchez APRN [facility]        Today's Diagnoses     Encounter for supervision of normal first pregnancy in third trimester    -  1    Rubella non-immune status, antepartum           Follow-ups after your visit        Follow-up notes from your care team     Return in about 1 week (around 6/5/2018) for Prenatal visit.      Your next 10 appointments already scheduled     Jun 04, 2018  9:30 AM CDT   ESTABLISHED PRENATAL with KELLY Francisco CNM   [facility] [place] ([facility])    [address]  [address]  [facility] [address] [phone]              Who to contact     If you have questions or need follow up information about today's clinic visit or your schedule please contact [facility] directly at [phone].  Normal or non-critical lab and imaging results will be communicated to you by MyChart, letter or phone within 4 business days after the clinic has received the results. If you do not hear from us within 7 days, please contact the clinic through MyChart or phone. If you have a critical or abnormal lab result, we will notify you by phone as soon as possible.  Submit refill requests through [employer] or call your pharmacy and they will forward the refill request to us. Please allow 3 business days for your refill to be completed.          Additional Information About Your Visit        Care EveryWhere ID     This is your Care EveryWhere ID. This could be used by other organizations to access your [place] medical records  [id number]        Your Vitals Were     Last Period BMI (Body Mass Index)                08/31/2017 (Exact Date) 29.95 kg/m2           Blood Pressure from Last 3 Encounters:   05/29/18  126/70   05/21/18 104/86   05/15/18 124/70    Weight from Last 3 Encounters:   05/29/18 197 lb (89.4 kg)   05/21/18 196 lb (88.9 kg)   05/15/18 195 lb (88.5 kg)              Today, you had the following     No orders found for display       Primary Care Provider Office Phone # Fax #    Kayc Olivo [phone]       [address] [address]        Equal Access to Services     [facility]: Hadii aad ku hadasho Soomaali, waaxda luqadaha, qaybta kaalmada adeegyada, waxay lori hayrachel degroot . So [facility] [phone].    ATENCIÓN: Si habla español, tiene a farrell disposición servicios gratuitos de asistencia lingüística. [facility] [phone].    We comply with applicable federal civil rights laws and [place] laws. We do not discriminate on the basis of race, color, national origin, age, disability, sex, sexual orientation, or gender identity.            Thank you!     Thank you for choosing [facility]  for your care. Our goal is always to provide you with excellent care. Hearing back from our patients is one way we can continue to improve our services. Please take a few minutes to complete the written survey that you may receive in the mail after your visit with us. Thank you!             Your Updated Medication List - Protect others around you: Learn how to safely use, store and throw away your medicines at www.disposemymeds.org.          This list is accurate as of 5/29/18 11:00 AM.  Always use your most recent med list.                   Brand Name Dispense Instructions for use Diagnosis    BENADRYL PO      Take by mouth nightly as needed        prenatal multivitamin plus iron 27-0.8 MG Tabs per tablet      Take 1 tablet by mouth daily        VITAMIN B6 PO

## 2018-05-29 NOTE — NURSING NOTE
"Chief Complaint   Patient presents with     Prenatal Care     38 weeks 5 days- cramping ongoing for 2 days       Initial /70 (BP Location: Right arm, Patient Position: Sitting, Cuff Size: Adult Regular)  Wt 197 lb (89.4 kg)  LMP 2017 (Exact Date)  BMI 29.95 kg/m2 Estimated body mass index is 29.95 kg/(m^2) as calculated from the following:    Height as of 5/15/18: 5' 8\" (1.727 m).    Weight as of this encounter: 197 lb (89.4 kg).  BP completed using cuff size: regular        The following HM Due: NONE    patient has appointment for today.  Dago Kelsey CMA                   "

## 2018-06-04 ENCOUNTER — HOSPITAL ENCOUNTER (INPATIENT)
Facility: CLINIC | Age: 33
LOS: 2 days | Discharge: HOME | End: 2018-06-06
Attending: ADVANCED PRACTICE MIDWIFE | Admitting: ADVANCED PRACTICE MIDWIFE
Payer: COMMERCIAL

## 2018-06-04 ENCOUNTER — PRENATAL OFFICE VISIT (OUTPATIENT)
Dept: OBGYN | Facility: CLINIC | Age: 33
End: 2018-06-04
Payer: COMMERCIAL

## 2018-06-04 VITALS
HEART RATE: 80 BPM | SYSTOLIC BLOOD PRESSURE: 122 MMHG | WEIGHT: 197.4 LBS | BODY MASS INDEX: 30.01 KG/M2 | DIASTOLIC BLOOD PRESSURE: 80 MMHG

## 2018-06-04 DIAGNOSIS — O09.899 RUBELLA NON-IMMUNE STATUS, ANTEPARTUM: ICD-10-CM

## 2018-06-04 DIAGNOSIS — Z34.80 SUPERVISION OF OTHER NORMAL PREGNANCY, ANTEPARTUM: Primary | ICD-10-CM

## 2018-06-04 DIAGNOSIS — Z28.39 RUBELLA NON-IMMUNE STATUS, ANTEPARTUM: ICD-10-CM

## 2018-06-04 PROBLEM — Z34.81 ENCOUNTER FOR SUPERVISION OF OTHER NORMAL PREGNANCY, FIRST TRIMESTER: Status: RESOLVED | Noted: 2017-11-10 | Resolved: 2018-06-04

## 2018-06-04 PROBLEM — Z37.9 NORMAL LABOR: Status: ACTIVE | Noted: 2018-06-04

## 2018-06-04 PROBLEM — Z36.89 ENCOUNTER FOR TRIAGE IN PREGNANT PATIENT: Status: ACTIVE | Noted: 2018-06-04

## 2018-06-04 PROCEDURE — 99207 ZZC PRENATAL VISIT: CPT | Performed by: OBSTETRICS & GYNECOLOGY

## 2018-06-04 PROCEDURE — 12000029 ZZH R&B OB INTERMEDIATE

## 2018-06-04 PROCEDURE — G0463 HOSPITAL OUTPT CLINIC VISIT: HCPCS

## 2018-06-04 RX ORDER — OXYCODONE AND ACETAMINOPHEN 5; 325 MG/1; MG/1
1 TABLET ORAL
Status: DISCONTINUED | OUTPATIENT
Start: 2018-06-04 | End: 2018-06-05 | Stop reason: CLARIF

## 2018-06-04 RX ORDER — SODIUM CHLORIDE, SODIUM LACTATE, POTASSIUM CHLORIDE, CALCIUM CHLORIDE 600; 310; 30; 20 MG/100ML; MG/100ML; MG/100ML; MG/100ML
INJECTION, SOLUTION INTRAVENOUS CONTINUOUS
Status: DISCONTINUED | OUTPATIENT
Start: 2018-06-04 | End: 2018-06-05 | Stop reason: CLARIF

## 2018-06-04 RX ORDER — NALOXONE HYDROCHLORIDE 0.4 MG/ML
.1-.4 INJECTION, SOLUTION INTRAMUSCULAR; INTRAVENOUS; SUBCUTANEOUS
Status: DISCONTINUED | OUTPATIENT
Start: 2018-06-04 | End: 2018-06-05 | Stop reason: CLARIF

## 2018-06-04 RX ORDER — OXYTOCIN 10 [USP'U]/ML
10 INJECTION, SOLUTION INTRAMUSCULAR; INTRAVENOUS
Status: DISCONTINUED | OUTPATIENT
Start: 2018-06-04 | End: 2018-06-05 | Stop reason: CLARIF

## 2018-06-04 RX ORDER — OXYTOCIN/0.9 % SODIUM CHLORIDE 30/500 ML
100-340 PLASTIC BAG, INJECTION (ML) INTRAVENOUS CONTINUOUS PRN
Status: COMPLETED | OUTPATIENT
Start: 2018-06-04 | End: 2018-06-05

## 2018-06-04 RX ORDER — CARBOPROST TROMETHAMINE 250 UG/ML
250 INJECTION, SOLUTION INTRAMUSCULAR
Status: DISCONTINUED | OUTPATIENT
Start: 2018-06-04 | End: 2018-06-05 | Stop reason: CLARIF

## 2018-06-04 RX ORDER — IBUPROFEN 800 MG/1
800 TABLET, FILM COATED ORAL
Status: DISCONTINUED | OUTPATIENT
Start: 2018-06-04 | End: 2018-06-05 | Stop reason: CLARIF

## 2018-06-04 RX ORDER — METHYLERGONOVINE MALEATE 0.2 MG/ML
200 INJECTION INTRAVENOUS
Status: DISCONTINUED | OUTPATIENT
Start: 2018-06-04 | End: 2018-06-05 | Stop reason: CLARIF

## 2018-06-04 RX ORDER — ACETAMINOPHEN 325 MG/1
650 TABLET ORAL EVERY 4 HOURS PRN
Status: DISCONTINUED | OUTPATIENT
Start: 2018-06-04 | End: 2018-06-05 | Stop reason: CLARIF

## 2018-06-04 RX ORDER — ONDANSETRON 2 MG/ML
4 INJECTION INTRAMUSCULAR; INTRAVENOUS EVERY 6 HOURS PRN
Status: DISCONTINUED | OUTPATIENT
Start: 2018-06-04 | End: 2018-06-05 | Stop reason: CLARIF

## 2018-06-04 NOTE — PROGRESS NOTES
S:   Baby active.  Occasional uterine cramping, denies vaginal bleeding or leaking of fluid. Desires membrane stripping today     O: Vitals: /80 (BP Location: Right arm, Patient Position: Chair, Cuff Size: Adult Regular)  Pulse 80  Wt 197 lb 6.4 oz (89.5 kg)  LMP 08/31/2017 (Exact Date)  BMI 30.01 kg/m2  BMI= Body mass index is 30.01 kg/(m^2).  Exam:  Constitutional: healthy, alert and no distress    A/P:  IUP at 39w4d, membranes stripped  Reviewed labor signs. Return to clinic in 1 week if not delivered.   Reviewed IOL at 41w if necessary.       Dona Oh MD

## 2018-06-04 NOTE — IP AVS SNAPSHOT
MRN:[id number]                      After Visit Summary   6/4/2018    Ernie Jimenes    MRN: [id number]           Thank you!     Thank you for choosing [facility] for your care. Our goal is always to provide you with excellent care. Hearing back from our patients is one way we can continue to improve our services. Please take a few minutes to complete the written survey that you may receive in the mail after you visit. If you would like to speak to someone directly about your visit please contact Patient Relations at [phone]. Thank you!          Patient Information     Date Of Birth          1985        About your hospital stay     You were admitted on:  June 4, 2018 You last received care in the:  [facility]    You were discharged on:  June 6, 2018       Who to Call     For medical emergencies, please call 911.  For non-urgent questions about your medical care, please call your primary care provider or clinic, [phone]          Attending Provider     Provider Specialty    Leelee Forrester APRN CNM [facility]       Primary Care Provider Office Phone # Fax #    Kacy Olivo [phone]      Your next 10 appointments already scheduled     Jun 11, 2018 11:30 AM CDT   ESTABLISHED PRENATAL with KELLY Hughes CNM   [facility]    [address] [place] [address]  [address]  [facility] [address] [phone]              Further instructions from your care team       Postpartum Vaginal Delivery Instructions    Follow up in clinic at 2 weeks and 6 weeks , Instructed to make appointment with OBGYN for [place]    Home care  [phone]      Activity       Ask family and friends for help when you need it.    Do not place anything in your vagina for 6 weeks.    You are not restricted on other activities, but take it easy for a few weeks to allow your body to recover from delivery.  You are  able to do any activities you feel up to that point.    No driving until you have stopped taking your pain medications (usually two weeks after delivery).     Call your health care provider if you have any of these symptoms:       Increased pain, swelling, redness, or fluid around your stiches from an episiotomy or perineal tear.    A fever above 100.4 F (38 C) with or without chills when placing a thermometer under your tongue.    You soak a sanitary pad with blood within 1 hour, or you see blood clots larger than a golf ball.    Bleeding that lasts more than 6 weeks.    Vaginal discharge that smells bad.    Severe pain, cramping or tenderness in your lower belly area.    A need to urinate more frequently (use the toilet more often), more urgently (use the toilet very quickly), or it burns when you urinate.    Nausea and vomiting.    Redness, swelling or pain around a vein in your leg.    Problems breastfeeding or a red or painful area on your breast.    Chest pain and cough or are gasping for air.    Problems coping with sadness, anxiety, or depression.  If you have any concerns about hurting yourself or the baby, call your provider immediately.     You have questions or concerns after you return home.     Keep your hands clean:  Always wash your hands before touching your perineal area and stitches.  This helps reduce your risk of infection.  If your hands aren't dirty, you may use an alcohol hand-rub to clean your hands. Keep your nails clean and short.      Please make appointment with an OBGYN physician for Colposcopy. This is a follow up from the abnormal pap smear early in the pregnancy.   Follow up appointments with Midwives 2 weeks and 6 weeks.     Pending Results     No orders found from 6/2/2018 to 6/5/2018.            Statement of Approval     Ordered          06/06/18 0945  I have reviewed and agree with all the recommendations and orders detailed in this document.  EFFECTIVE NOW     Approved and  "electronically signed by:  Charity Sanchez APRN CNM             Admission Information     Date & Time Provider Department Dept. Phone    2018 Leelee Forrester APRN CNM [facility] [phone]      Your Vitals Were     Blood Pressure Pulse Temperature Respirations Height Weight    103/69 72 98.3  F (36.8  C) (Oral) 16 1.727 m (5' 8\") 89.4 kg (197 lb)    Last Period BMI (Body Mass Index)                2017 (Exact Date) 29.95 kg/m2          Care EveryWhere ID     This is your Care EveryWhere ID. This could be used by other organizations to access your [place] medical records  [id number]        Equal Access to Services     [facility] : Naseem Joiner, waaxda luqadaha, qaybta kaalmada adeegyada, veronique jackson. So [facility] [phone].    ATENCIÓN: Si habla español, tiene a farrell disposición servicios gratuitos de asistencia lingüística. [facility] [phone].    We comply with applicable federal civil rights laws and [place] laws. We do not discriminate on the basis of race, color, national origin, age, disability, sex, sexual orientation, or gender identity.               Review of your medicines      START taking        Dose / Directions    senna-docusate 8.6-50 MG per tablet   Commonly known as:  SENOKOT-S;PERICOLACE   Used for:   (normal spontaneous vaginal delivery)        Dose:  1 tablet   Take 1 tablet by mouth 2 times daily   Quantity:  100 tablet   Refills:  0         CONTINUE these medicines which have NOT CHANGED        Dose / Directions    BENADRYL PO        Take by mouth nightly as needed   Refills:  0       prenatal multivitamin plus iron 27-0.8 MG Tabs per tablet        Dose:  1 tablet   Take 1 tablet by mouth daily   Refills:  0            Where to get your medicines      These medications were sent to [facility] [place], [place] - [address] [address]  [address] [facility] [address] "     Phone:  [phone]     senna-docusate 8.6-50 MG per tablet                Protect others around you: Learn how to safely use, store and throw away your medicines at www.disposemymeds.org.             Medication List: This is a list of all your medications and when to take them. Check marks below indicate your daily home schedule. Keep this list as a reference.      Medications           Morning Afternoon Evening Bedtime As Needed    BENADRYL PO   Take by mouth nightly as needed                                prenatal multivitamin plus iron 27-0.8 MG Tabs per tablet   Take 1 tablet by mouth daily                                senna-docusate 8.6-50 MG per tablet   Commonly known as:  SENOKOT-S;PERICOLACE   Take 1 tablet by mouth 2 times daily   Last time this was given:  1 tablet on 6/6/2018  8:46 AM

## 2018-06-04 NOTE — IP AVS SNAPSHOT
[facility]    [address]    [facility] [address]    Phone:  [phone]    Fax:  [phone]                                       After Visit Summary   6/4/2018    Ernie Jimenes    MRN: [id number]           After Visit Summary Signature Page     I have received my discharge instructions, and my questions have been answered. I have discussed any challenges I see with this plan with the nurse or doctor.    ..........................................................................................................................................  Patient/Patient Representative Signature      ..........................................................................................................................................  Patient Representative Print Name and Relationship to Patient    ..................................................               ................................................  Date                                            Time    ..........................................................................................................................................  Reviewed by Signature/Title    ...................................................              ..............................................  Date                                                            Time

## 2018-06-04 NOTE — NURSING NOTE
"  Chief Complaint   Patient presents with     Prenatal Care     39 weeks 4 days, patient would like membranes stripped today.        Initial /80 (BP Location: Right arm, Patient Position: Chair, Cuff Size: Adult Regular)  Pulse 80  Wt 197 lb 6.4 oz (89.5 kg)  LMP 08/31/2017 (Exact Date)  BMI 30.01 kg/m2 Estimated body mass index is 30.01 kg/(m^2) as calculated from the following:    Height as of 5/15/18: 5' 8\" (1.727 m).    Weight as of this encounter: 197 lb 6.4 oz (89.5 kg).  Medication Reconciliation: complete  Jordan Jean Baptiste CMA          "

## 2018-06-04 NOTE — MR AVS SNAPSHOT
After Visit Summary   6/4/2018    Ernie Jimenes    MRN: [id number]           Patient Information     Date Of Birth          1985        Visit Information        Provider Department      6/4/2018 9:45 AM Dona Oh MD [facility]        Today's Diagnoses     Supervision of other normal pregnancy, antepartum    -  1    Rubella non-immune status, antepartum           Follow-ups after your visit        Your next 10 appointments already scheduled     Jun 11, 2018 11:30 AM CDT   ESTABLISHED PRENATAL with KELLY Hughes CNM   [facility] ([facility])    [address]  [address]  [place] [address] [phone]              Who to contact     If you have questions or need follow up information about today's clinic visit or your schedule please contact [facility] directly at [phone].  Normal or non-critical lab and imaging results will be communicated to you by MyChart, letter or phone within 4 business days after the clinic has received the results. If you do not hear from us within 7 days, please contact the clinic through MyChart or phone. If you have a critical or abnormal lab result, we will notify you by phone as soon as possible.  Submit refill requests through [employer] or call your pharmacy and they will forward the refill request to us. Please allow 3 business days for your refill to be completed.          Additional Information About Your Visit        Care EveryWhere ID     This is your Care EveryWhere ID. This could be used by other organizations to access your [place] medical records  [id number]        Your Vitals Were     Pulse Last Period BMI (Body Mass Index)             80 08/31/2017 (Exact Date) 30.01 kg/m2          Blood Pressure from Last 3 Encounters:   06/04/18 122/80   05/29/18 126/70   05/21/18 104/86    Weight from Last 3 Encounters:   06/04/18 197 lb 6.4 oz (89.5 kg)   05/29/18 197 lb  (89.4 kg)   05/21/18 196 lb (88.9 kg)              Today, you had the following     No orders found for display       Primary Care Provider Office Phone # Fax #    Kacy Olivo [phone]       [facility] [place] [address] [address] [address]        Equal Access to Services     [facility]: Hadii aad ku hadasho Soomaali, waaxda luqadaha, qaybta kaalmada adeegyada, waxay idiin hayaan adeeg kharash la'aan . So [facility] [phone].    ATENCIÓN: Si habla español, tiene a farrell disposición servicios gratuitos de asistencia lingüística. Llame al [phone].    We comply with applicable federal civil rights laws and [place] laws. We do not discriminate on the basis of race, color, national origin, age, disability, sex, sexual orientation, or gender identity.            Thank you!     Thank you for choosing [facility]  for your care. Our goal is always to provide you with excellent care. Hearing back from our patients is one way we can continue to improve our services. Please take a few minutes to complete the written survey that you may receive in the mail after your visit with us. Thank you!             Your Updated Medication List - Protect others around you: Learn how to safely use, store and throw away your medicines at www.disposemymeds.org.          This list is accurate as of 6/4/18 10:37 AM.  Always use your most recent med list.                   Brand Name Dispense Instructions for use Diagnosis    BENADRYL PO      Take by mouth nightly as needed        prenatal multivitamin plus iron 27-0.8 MG Tabs per tablet      Take 1 tablet by mouth daily

## 2018-06-05 ENCOUNTER — ANESTHESIA EVENT (OUTPATIENT)
Dept: OBGYN | Facility: CLINIC | Age: 33
End: 2018-06-05
Payer: COMMERCIAL

## 2018-06-05 ENCOUNTER — ANESTHESIA (OUTPATIENT)
Dept: OBGYN | Facility: CLINIC | Age: 33
End: 2018-06-05
Payer: COMMERCIAL

## 2018-06-05 LAB
ABO + RH BLD: NORMAL
ABO + RH BLD: NORMAL
BASOPHILS # BLD AUTO: 0 10E9/L (ref 0–0.2)
BASOPHILS NFR BLD AUTO: 0.2 %
DIFFERENTIAL METHOD BLD: ABNORMAL
EOSINOPHIL # BLD AUTO: 0 10E9/L (ref 0–0.7)
EOSINOPHIL NFR BLD AUTO: 0.1 %
ERYTHROCYTE [DISTWIDTH] IN BLOOD BY AUTOMATED COUNT: 12.9 % (ref 10–15)
HCT VFR BLD AUTO: 38.2 % (ref 35–47)
HGB BLD-MCNC: 13.5 G/DL (ref 11.7–15.7)
IMM GRANULOCYTES # BLD: 0.1 10E9/L (ref 0–0.4)
IMM GRANULOCYTES NFR BLD: 0.3 %
LYMPHOCYTES # BLD AUTO: 0.9 10E9/L (ref 0.8–5.3)
LYMPHOCYTES NFR BLD AUTO: 5.9 %
MCH RBC QN AUTO: 31.5 PG (ref 26.5–33)
MCHC RBC AUTO-ENTMCNC: 35.3 G/DL (ref 31.5–36.5)
MCV RBC AUTO: 89 FL (ref 78–100)
MONOCYTES # BLD AUTO: 0.6 10E9/L (ref 0–1.3)
MONOCYTES NFR BLD AUTO: 3.9 %
NEUTROPHILS # BLD AUTO: 13.9 10E9/L (ref 1.6–8.3)
NEUTROPHILS NFR BLD AUTO: 89.6 %
NRBC # BLD AUTO: 0 10*3/UL
NRBC BLD AUTO-RTO: 0 /100
PLATELET # BLD AUTO: 210 10E9/L (ref 150–450)
RBC # BLD AUTO: 4.29 10E12/L (ref 3.8–5.2)
SPECIMEN EXP DATE BLD: NORMAL
T PALLIDUM AB SER QL: NONREACTIVE
WBC # BLD AUTO: 15.5 10E9/L (ref 4–11)

## 2018-06-05 PROCEDURE — 86900 BLOOD TYPING SEROLOGIC ABO: CPT | Performed by: ADVANCED PRACTICE MIDWIFE

## 2018-06-05 PROCEDURE — 12000031 ZZH R&B OB CRITICAL

## 2018-06-05 PROCEDURE — 27110038 ZZH RX 271

## 2018-06-05 PROCEDURE — 25000125 ZZHC RX 250: Performed by: ADVANCED PRACTICE MIDWIFE

## 2018-06-05 PROCEDURE — 25000128 H RX IP 250 OP 636: Performed by: ADVANCED PRACTICE MIDWIFE

## 2018-06-05 PROCEDURE — 86780 TREPONEMA PALLIDUM: CPT | Performed by: ADVANCED PRACTICE MIDWIFE

## 2018-06-05 PROCEDURE — 25000132 ZZH RX MED GY IP 250 OP 250 PS 637: Performed by: ADVANCED PRACTICE MIDWIFE

## 2018-06-05 PROCEDURE — 40000671 ZZH STATISTIC ANESTHESIA CASE

## 2018-06-05 PROCEDURE — 86901 BLOOD TYPING SEROLOGIC RH(D): CPT | Performed by: ADVANCED PRACTICE MIDWIFE

## 2018-06-05 PROCEDURE — 25000125 ZZHC RX 250

## 2018-06-05 PROCEDURE — 25000128 H RX IP 250 OP 636: Performed by: ANESTHESIOLOGY

## 2018-06-05 PROCEDURE — 37000011 ZZH ANESTHESIA WARD SERVICE

## 2018-06-05 PROCEDURE — 72200001 ZZH LABOR CARE VAGINAL DELIVERY SINGLE

## 2018-06-05 PROCEDURE — 59400 OBSTETRICAL CARE: CPT | Performed by: ADVANCED PRACTICE MIDWIFE

## 2018-06-05 PROCEDURE — 25000128 H RX IP 250 OP 636

## 2018-06-05 PROCEDURE — 3E0R3BZ INTRODUCTION OF ANESTHETIC AGENT INTO SPINAL CANAL, PERCUTANEOUS APPROACH: ICD-10-PCS | Performed by: ANESTHESIOLOGY

## 2018-06-05 PROCEDURE — 00HU33Z INSERTION OF INFUSION DEVICE INTO SPINAL CANAL, PERCUTANEOUS APPROACH: ICD-10-PCS | Performed by: ANESTHESIOLOGY

## 2018-06-05 PROCEDURE — 0HQ9XZZ REPAIR PERINEUM SKIN, EXTERNAL APPROACH: ICD-10-PCS | Performed by: ADVANCED PRACTICE MIDWIFE

## 2018-06-05 PROCEDURE — 85025 COMPLETE CBC W/AUTO DIFF WBC: CPT | Performed by: ADVANCED PRACTICE MIDWIFE

## 2018-06-05 RX ORDER — EPHEDRINE SULFATE 50 MG/ML
5 INJECTION, SOLUTION INTRAMUSCULAR; INTRAVENOUS; SUBCUTANEOUS
Status: DISCONTINUED | OUTPATIENT
Start: 2018-06-05 | End: 2018-06-05

## 2018-06-05 RX ORDER — HYDROMORPHONE HYDROCHLORIDE 1 MG/ML
INJECTION, SOLUTION INTRAMUSCULAR; INTRAVENOUS; SUBCUTANEOUS
Status: COMPLETED
Start: 2018-06-05 | End: 2018-06-05

## 2018-06-05 RX ORDER — HYDROCORTISONE 2.5 %
CREAM (GRAM) TOPICAL 3 TIMES DAILY PRN
Status: DISCONTINUED | OUTPATIENT
Start: 2018-06-05 | End: 2018-06-06 | Stop reason: HOSPADM

## 2018-06-05 RX ORDER — NALOXONE HYDROCHLORIDE 0.4 MG/ML
.1-.4 INJECTION, SOLUTION INTRAMUSCULAR; INTRAVENOUS; SUBCUTANEOUS
Status: DISCONTINUED | OUTPATIENT
Start: 2018-06-05 | End: 2018-06-05

## 2018-06-05 RX ORDER — BISACODYL 10 MG
10 SUPPOSITORY, RECTAL RECTAL DAILY PRN
Status: DISCONTINUED | OUTPATIENT
Start: 2018-06-07 | End: 2018-06-06 | Stop reason: HOSPADM

## 2018-06-05 RX ORDER — SODIUM CHLORIDE 9 MG/ML
INJECTION, SOLUTION INTRAVENOUS CONTINUOUS
Status: DISCONTINUED | OUTPATIENT
Start: 2018-06-05 | End: 2018-06-05 | Stop reason: CLARIF

## 2018-06-05 RX ORDER — NALOXONE HYDROCHLORIDE 0.4 MG/ML
.1-.4 INJECTION, SOLUTION INTRAMUSCULAR; INTRAVENOUS; SUBCUTANEOUS
Status: DISCONTINUED | OUTPATIENT
Start: 2018-06-05 | End: 2018-06-06 | Stop reason: HOSPADM

## 2018-06-05 RX ORDER — IBUPROFEN 800 MG/1
800 TABLET, FILM COATED ORAL EVERY 6 HOURS PRN
Status: DISCONTINUED | OUTPATIENT
Start: 2018-06-05 | End: 2018-06-06 | Stop reason: HOSPADM

## 2018-06-05 RX ORDER — NALBUPHINE HYDROCHLORIDE 10 MG/ML
2.5-5 INJECTION, SOLUTION INTRAMUSCULAR; INTRAVENOUS; SUBCUTANEOUS EVERY 6 HOURS PRN
Status: DISCONTINUED | OUTPATIENT
Start: 2018-06-05 | End: 2018-06-05

## 2018-06-05 RX ORDER — OXYTOCIN 10 [USP'U]/ML
10 INJECTION, SOLUTION INTRAMUSCULAR; INTRAVENOUS
Status: DISCONTINUED | OUTPATIENT
Start: 2018-06-05 | End: 2018-06-06 | Stop reason: HOSPADM

## 2018-06-05 RX ORDER — EPHEDRINE SULFATE 50 MG/ML
INJECTION, SOLUTION INTRAMUSCULAR; INTRAVENOUS; SUBCUTANEOUS
Status: DISCONTINUED
Start: 2018-06-05 | End: 2018-06-05 | Stop reason: HOSPADM

## 2018-06-05 RX ORDER — ACETAMINOPHEN 325 MG/1
650 TABLET ORAL EVERY 4 HOURS PRN
Status: DISCONTINUED | OUTPATIENT
Start: 2018-06-05 | End: 2018-06-06 | Stop reason: HOSPADM

## 2018-06-05 RX ORDER — AMOXICILLIN 250 MG
2 CAPSULE ORAL 2 TIMES DAILY
Status: DISCONTINUED | OUTPATIENT
Start: 2018-06-05 | End: 2018-06-06 | Stop reason: HOSPADM

## 2018-06-05 RX ORDER — OXYTOCIN/0.9 % SODIUM CHLORIDE 30/500 ML
340 PLASTIC BAG, INJECTION (ML) INTRAVENOUS CONTINUOUS PRN
Status: DISCONTINUED | OUTPATIENT
Start: 2018-06-05 | End: 2018-06-06 | Stop reason: HOSPADM

## 2018-06-05 RX ORDER — OXYTOCIN/0.9 % SODIUM CHLORIDE 30/500 ML
100 PLASTIC BAG, INJECTION (ML) INTRAVENOUS CONTINUOUS
Status: DISCONTINUED | OUTPATIENT
Start: 2018-06-05 | End: 2018-06-06 | Stop reason: HOSPADM

## 2018-06-05 RX ORDER — MISOPROSTOL 200 UG/1
400 TABLET ORAL
Status: DISCONTINUED | OUTPATIENT
Start: 2018-06-05 | End: 2018-06-06 | Stop reason: HOSPADM

## 2018-06-05 RX ORDER — AMOXICILLIN 250 MG
1 CAPSULE ORAL 2 TIMES DAILY
Status: DISCONTINUED | OUTPATIENT
Start: 2018-06-05 | End: 2018-06-06 | Stop reason: HOSPADM

## 2018-06-05 RX ORDER — LANOLIN 100 %
OINTMENT (GRAM) TOPICAL
Status: DISCONTINUED | OUTPATIENT
Start: 2018-06-05 | End: 2018-06-06 | Stop reason: HOSPADM

## 2018-06-05 RX ADMIN — Medication 15 ML: at 00:55

## 2018-06-05 RX ADMIN — NALBUPHINE HYDROCHLORIDE 2.5 MG: 10 INJECTION, SOLUTION INTRAMUSCULAR; INTRAVENOUS; SUBCUTANEOUS at 06:20

## 2018-06-05 RX ADMIN — Medication: at 00:56

## 2018-06-05 RX ADMIN — HYDROMORPHONE HYDROCHLORIDE 0.5 MG: 1 INJECTION, SOLUTION INTRAMUSCULAR; INTRAVENOUS; SUBCUTANEOUS at 00:55

## 2018-06-05 RX ADMIN — OXYTOCIN-SODIUM CHLORIDE 0.9% IV SOLN 30 UNIT/500ML 340 ML/HR: 30-0.9/5 SOLUTION at 03:50

## 2018-06-05 RX ADMIN — SODIUM CHLORIDE, POTASSIUM CHLORIDE, SODIUM LACTATE AND CALCIUM CHLORIDE: 600; 310; 30; 20 INJECTION, SOLUTION INTRAVENOUS at 01:14

## 2018-06-05 RX ADMIN — SODIUM CHLORIDE, POTASSIUM CHLORIDE, SODIUM LACTATE AND CALCIUM CHLORIDE 1000 ML: 600; 310; 30; 20 INJECTION, SOLUTION INTRAVENOUS at 00:15

## 2018-06-05 RX ADMIN — IBUPROFEN 800 MG: 800 TABLET ORAL at 05:01

## 2018-06-05 RX ADMIN — SODIUM CHLORIDE 250 ML: 9 INJECTION, SOLUTION INTRAVENOUS at 02:44

## 2018-06-05 RX ADMIN — SENNOSIDES AND DOCUSATE SODIUM 1 TABLET: 8.6; 5 TABLET ORAL at 11:16

## 2018-06-05 RX ADMIN — SENNOSIDES AND DOCUSATE SODIUM 1 TABLET: 8.6; 5 TABLET ORAL at 19:24

## 2018-06-05 RX ADMIN — NALBUPHINE HYDROCHLORIDE 2.5 MG: 10 INJECTION, SOLUTION INTRAMUSCULAR; INTRAVENOUS; SUBCUTANEOUS at 07:51

## 2018-06-05 RX ADMIN — IBUPROFEN 800 MG: 800 TABLET ORAL at 17:33

## 2018-06-05 RX ADMIN — IBUPROFEN 800 MG: 800 TABLET ORAL at 11:23

## 2018-06-05 NOTE — PROGRESS NOTES
"CNM PROGRESS NOTE    SUBJECTIVE:  Patient comfortable with epidural.     OBJECTIVE:  /61  Temp 98.5  F (36.9  C) (Oral)  Resp 18  Ht 1.727 m (5' 8\")  Wt 89.4 kg (197 lb)  LMP 2017 (Exact Date)  BMI 29.95 kg/m2    Fetal heart tones: Baseline 120's   Variability: moderate  Accelerations: present  Decelerations: absent    Contractions: Pt is tiara every 3-4 minutes, lasting 60 seconds and palpates moderate    Cervix: 6/ 100% / -1, Vtx  ROM: clear fluid    Pitocin- none  Antibiotics- none  Cervical ripening: N/A    ASSESSMENT:  IUP @ 39w5d active labor   GBS- negative     PLAN:   comfort measures prn    Anticipate     KELLY Salas CNM    "

## 2018-06-05 NOTE — PROGRESS NOTES
"CNM PROGRESS NOTE    SUBJECTIVE:  Patient comfortable with epidural. Turning side to side. Baird in place. Variable decels to the 60's with contractions.     OBJECTIVE:  /69  Temp 98.5  F (36.9  C) (Oral)  Resp 18  Ht 1.727 m (5' 8\")  Wt 89.4 kg (197 lb)  LMP 2017 (Exact Date)  BMI 29.95 kg/m2    Fetal heart tones: Baseline 120's   Variability: moderate  Accelerations: present  Decelerations: present, variable decels to 60's with contractions.     Contractions: Pt is tiara every 5-6 minutes, lasting 60 seconds and palpates moderate    Cervix: 6/ 100% / -1, Vtx, FSC placed.   ROM: clear fluid    Pitocin- none  Antibiotics- none  Cervical ripening: N/A    ASSESSMENT:  IUP @ 39w5d active labor   GBS- negative     PLAN:   comfort measures prn   Will continue to monitor mother and baby.   Anticipate     KELLY Salas CNM    "

## 2018-06-05 NOTE — PROGRESS NOTES
"CNM PROGRESS NOTE    SUBJECTIVE:  Baby continuing to have recurrent late decels.     OBJECTIVE:  BP 99/54  Temp 98.5  F (36.9  C) (Oral)  Resp 18  Ht 1.727 m (5' 8\")  Wt 89.4 kg (197 lb)  LMP 2017 (Exact Date)  BMI 29.95 kg/m2    Fetal heart tones: Baseline 120's   Variability: moderate  Accelerations: present  Decelerations: absent, recurrent variable decels to 60's    Contractions: Pt is tiara every 5-6 minutes, lasting 60 seconds and palpates strong    Cervix: 7/ 100% / -1, Vtx, IUPC placed, amnioinfusion started.   ROM: clear fluid    Pitocin- none  Antibiotics- none  Cervical ripening: N/A    ASSESSMENT:  IUP @ 39w5d active labor   GBS- negative     PLAN:   comfort measures prn   Amnioinfusion started.   Paged Dr. Roque to update him on FHR strip.   Anticipate     KELLY Salas CNM    "

## 2018-06-05 NOTE — ANESTHESIA PROCEDURE NOTES
Peripheral nerve/Neuraxial procedure note : epidural catheter  Pre-Procedure  Performed by MUKUL FLETCHER  Referred by HOLLIS KENNEY  Location: OB      Pre-Anesthestic Checklist: patient identified, IV checked, risks and benefits discussed, informed consent, monitors and equipment checked, pre-op evaluation and at physician/surgeon's request    Timeout  Correct Patient: Yes   Correct Procedure: Yes   Correct Site: Yes   Correct Laterality: N/A   Correct Position: Yes   Site Marked: N/A   .   Procedure Documentation    .    Procedure:    Epidural catheter.     .  .       Assessment/Narrative  .  .  . Comments:  Pre-Procedure  Performed by Mukul Fletcher MD  Location: OB.      PreAnesthestic Checklist: patient identified, IV checked, risks and benefits discussed, informed consent obtained, monitors and equipment checked, pre-op evaluation and at physician/surgeon's request.    Timeout   Correct Patient: Yes  Correct Procedure: Epidural catheter placement  Correct Site: Yes   Correct Position: Yes    Procedure Documentation  Procedure:   Epidural catheter block for Labor    Patient currently in labor and she and OBMD request a labor epidural to control her labor pains. Patient was interviewed and examined. Procedure and risks including but not limited to bleeding, infection, nerve injury, paralysis, PDPH, and inadequate block requiring intervention discussed with patient. Questions answered. This epidural is to be placed in anticipation of vaginal delivery.  She consents to the epidural procedure.  Time-out was performed.  I or my partners remain immediately available for management of any issues or complications and will monitor at appropriate intervals.  Procedure: Patient sitting. Betadine prep x 3. Sterile drape applied.  Mask and gloves used.  Lidocaine 1%  local infiltration at L 3-4.  17 G. Tuohy needle at L3-4 by loss of resistance into epidural space.  No CSF, paresthesia or blood. 1.5 % Lidocaine with 1:200,000  Epinephrine 5cc test dose. Epidural catheter inserted w/o resistance to 5 cm in epidural space. Then 0.125% bupivicaine 15 cc with NS 5 cc.  Aspiration negative for blood and CSF.   Negative for neuro change, paresthesia or symptoms of intravascular injection or intrathecal injection.  Infusion orders written and infusion of 0.125% bupivicaine 15cc per hour started.    Mukul Fletcher MD

## 2018-06-05 NOTE — H&P
Labor Admission History and Physical    Ernie Jimenes is a 32 year old female,  ,   ,     Patient's last menstrual period was 2017 (exact date)., Estimated Date of Delivery: 2018 is calculated from lmp and verified with U/S     Pt is admitted to the [facility] at [facility] on 2018 at 11:39 PM     in early labor.  Membranes are intact    HPI:   Baby active Yes  Labor start time , when did contractions become regular.  Membranes are intact.  Bloody show Yes   Any changes with medical history since last prenatal visit No  Declines syphilis screening. No        PRENATAL COURSE  Prenatal care began at 10 wks gestation for a total of 13 prenatal visits.  Total wt gain 31lbs  Prenatal Blood Pressure: WNL  Prenatal course was essentially uncomplicated    HISTORY  Allergies   Allergen Reactions     No Known Drug Allergies      Past Medical History:   Diagnosis Date     +++ OUTSIDE RECORDS SCAN  +++      Anxiety 2015     ASCUS on Pap smear     hpv 16 high risk, s/p colposcopy ob/gyn [place] basically nl     Esophageal reflux      History of colposcopy with cervical biopsy 16     neg     LSIL (low grade squamous intraepithelial lesion) on Pap smear 05/14/10     No active medical problems      Personal history of tobacco use, presenting hazards to health      Past Surgical History:   Procedure Laterality Date     COLPOSCOPY,BX CERVIX/ENDOCERV CURR      basically nl, [facility] [place]     DENTAL SURGERY       HC CREATE EARDRUM OPENING,GEN ANESTH  age 1    P.E. Tubes     NO       Family History   Problem Relation Age of Onset     Lipids Mother      Depression Mother      Hypertension Mother      Thyroid Disease Mother      Breast Cancer Maternal Grandmother      Breast Cancer Maternal Aunt      Social History   Substance Use Topics     Smoking status: Former Smoker     Packs/day: 0.25     Years: 4.00     Types: Cigarettes     Smokeless  "tobacco: Never Used     Alcohol use No     Obstetric History       T1      L1     SAB0   TAB1   Ectopic0   Multiple0   Live Births1       # Outcome Date GA Lbr Shyam/2nd Weight Sex Delivery Anes PTL Lv   3 Current            2 Term 05/12/15 40w3d 07:21 / 04:06 3.039 kg (6 lb 11.2 oz) M Vag-Spont EPI N AMINAH      Name: Carlos      Apgar1:  9                Apgar5: 9   1 TAB  8w0d                 LABS:     Lab Results   Component Value Date    ABO A 2017       Lab Results   Component Value Date    RH Pos 2017     Rhogam not indicated  No results found for: RUBELLAABIGG   Anti treponema negative   No results found for: HIV  Lab Results   Component Value Date    HGB 12.5 2018      Lab Results   Component Value Date    HEPBANG Nonreactive 2017     Lab Results   Component Value Date    GBS Negative 05/15/2018       ROS  Pt denies significant constitutional symptoms including fever and/or malaise.  Pt denies significant respiratory, cardiovacular, GI, or muscular/skeletal complaints.    Neuro: Denies HA and visual changes  Psych: Denies depression and anxiety    PHYSICAL EXAM:  /85  Temp 98.5  F (36.9  C) (Oral)  Resp 18  Ht 1.727 m (5' 8\")  Wt 89.4 kg (197 lb)  LMP 2017 (Exact Date)  BMI 29.95 kg/m2  General appearance:  healthy, alert and active   Heart: RRR  Lungs: CTA bilaterally, normal respiratory effort  Abdomen: gravid, single vertex fetus, non-tender, EFW 7.5 lbs.   Legs: reflexes 2+ bilaterally, no clonus, no edema     Contractions: Pt is tiara every 5 minutes, lasting 60 seconds and palpates moderate    Fetal heart tones: baseline 130 with moderate variability, accelerationspresent and decelerations absent  Category  1         fetal heart rate tracing    Pelvic Exam: 4/ 90% / Anterior/ soft/ -1 per RN  BLOODY SHOW:: yes   Membranes as listed above    ASSESSMENT:  IUP @ 39.4 wks gestation  in early labor  NST  reactive  Parity: Multip  GBS negative and " membranes intact  Category  1         fetal heart rate tracing    PLAN:  Admit - see IP orders  Pain medication prn.   Anticipate     Leelee DOOLEYM

## 2018-06-05 NOTE — L&D DELIVERY NOTE
Delivery Note: Fetus continued to have recurrent variable decels to the 60's, little improvement with amnioinfusion. Dr. Roque aware. Patient very comfortable with epidural. After 45 minutes from starting amnioinfusion, patient was found to be complete and +2 station. Maternal pushing began with uterine contractions. Good maternal pushing effort.  to viable male at 0343. Compound presentation of right arm. Infant brought to maternal chest. Vigorous with spontaneous cry. Apgar's 9 at one minute and 9 at five minutes. Weight 6lbs 15oz. Cord clamped and cut by FATHER OF BABY after one minute of delayed cord clamping. Spontaneous delivery of intact placenta, 3 vessel cord. QBL 200cc. Pitocin bolus running. First degree perineal laceration repaired under epidural anesthesia with 3.0 vicryl. Mother and infant stable and bonding. Leelee DOOLEY

## 2018-06-05 NOTE — ANESTHESIA PREPROCEDURE EVALUATION
PAC NOTE:       ANESTHESIA PRE EVALUATION:  Anesthesia Evaluation       history and physical reviewed .             ROS/MED HX    ENT/Pulmonary:  - neg pulmonary ROS     Neurologic:       Cardiovascular:  - neg cardiovascular ROS       METS/Exercise Tolerance:     Hematologic:         Musculoskeletal:         GI/Hepatic:     (+) GERD       Renal/Genitourinary:         Endo:         Psychiatric:         Infectious Disease:         Malignancy:         Other:                     Physical Exam      Airway   Mallampati: II  TM distance: > 3 FB  Neck ROM: full    Dental     Cardiovascular       Pulmonary         neg OB ROS            Anesthesia Plan      History & Physical Review      ASA Status:  .  OB Epidural Asa: 2            Postoperative Care      Consents  Anesthetic plan, risks, benefits and alternatives discussed with:  Patient..                            .

## 2018-06-05 NOTE — PROVIDER NOTIFICATION
06/04/18 2345   Provider Notification   Provider Name/Title Leelee Forrester CNM   Method of Notification At Bedside   Notification Reason Decels   CNM at bedside to see pt and viewed tracing thus far.  Orders received for intermittent EFM PRN.

## 2018-06-05 NOTE — PLAN OF CARE
Problem: Patient Care Overview  Goal: Plan of Care/Patient Progress Review  Outcome: No Change  VSS, pt meets expected goals, has voided adequately, feeding with formula, talked about expectations and pain control, answered questions.

## 2018-06-05 NOTE — PLAN OF CARE
Problem: Patient Care Overview  Goal: Plan of Care/Patient Progress Review  Outcome: Improving  Data: Ernie Jimenes transferred to 444 via wheelchair at 0610. Baby transferred via parent's arms.  Action: Receiving unit notified of transfer: Yes. Patient and family notified of room change. Belongings sent to receiving unit. Accompanied by Registered Nurse. Oriented patient to surroundings. Call light within reach. ID bands double-checked with Beti Parks RN.  This writer will continue to provide care through end of shift.  Response: Patient tolerated transfer and is stable.

## 2018-06-05 NOTE — PLAN OF CARE
Data: Patient presented to Birthplace: 2018 11:03 PM.  Reason for maternal/fetal assessment is uterine contractions, scant vaginal bleeding. Patient reports her membranes were stripped in clinic this morning, she began tiara at 1300, and contractions became more painful at 2000 after which she noted pink bloody show.  Patient is a .  Prenatal record reviewed. Pregnancy has been uncomplicated..  Gestational Age 39w4d. VSS. Fetal movement present. Patient denies leaking of vaginal fluid/rupture of membranes, headache, visual disturbances, epigastric or URQ pain, significant edema. Support person is present.   Action: Verbal consent for EFM. Triage assessment completed. Bill of rights reviewed.  Response: Patient verbalized agreement with plan. Will contact Dr Leelee Forrester with update and further orders.

## 2018-06-05 NOTE — PROVIDER NOTIFICATION
06/04/18 9482   Provider Notification   Provider Name/Title Leelee Forrester CNSTEFANIE   Method of Notification Phone   Request Evaluate - Remote   Notification Reason Patient Arrived;Uterine Activity;Pain;Bleeding;SVE   Updated CNM of pt arrival, contractions q 3-4 minutes, cervix 4/90/-1 (change from 2cm in clinic) with bloody show, membranes intact.  CNM will enter orders.

## 2018-06-06 VITALS
HEART RATE: 72 BPM | WEIGHT: 197 LBS | DIASTOLIC BLOOD PRESSURE: 69 MMHG | BODY MASS INDEX: 29.86 KG/M2 | HEIGHT: 68 IN | RESPIRATION RATE: 16 BRPM | SYSTOLIC BLOOD PRESSURE: 103 MMHG | TEMPERATURE: 98.3 F

## 2018-06-06 LAB — HGB BLD-MCNC: 11.1 G/DL (ref 11.7–15.7)

## 2018-06-06 PROCEDURE — 25000128 H RX IP 250 OP 636: Performed by: ADVANCED PRACTICE MIDWIFE

## 2018-06-06 PROCEDURE — 90707 MMR VACCINE SC: CPT | Performed by: ADVANCED PRACTICE MIDWIFE

## 2018-06-06 PROCEDURE — 36415 COLL VENOUS BLD VENIPUNCTURE: CPT | Performed by: ADVANCED PRACTICE MIDWIFE

## 2018-06-06 PROCEDURE — 25000132 ZZH RX MED GY IP 250 OP 250 PS 637: Performed by: ADVANCED PRACTICE MIDWIFE

## 2018-06-06 PROCEDURE — 85018 HEMOGLOBIN: CPT | Performed by: ADVANCED PRACTICE MIDWIFE

## 2018-06-06 RX ORDER — AMOXICILLIN 250 MG
1 CAPSULE ORAL 2 TIMES DAILY
Qty: 100 TABLET | Refills: 0 | Status: SHIPPED | OUTPATIENT
Start: 2018-06-06 | End: 2018-07-18

## 2018-06-06 RX ADMIN — IBUPROFEN 800 MG: 800 TABLET ORAL at 06:33

## 2018-06-06 RX ADMIN — SENNOSIDES AND DOCUSATE SODIUM 1 TABLET: 8.6; 5 TABLET ORAL at 08:46

## 2018-06-06 RX ADMIN — IBUPROFEN 800 MG: 800 TABLET ORAL at 00:09

## 2018-06-06 RX ADMIN — MEASLES, MUMPS, AND RUBELLA VIRUS VACCINE LIVE 0.5 ML: 1000; 12500; 1000 INJECTION, POWDER, LYOPHILIZED, FOR SUSPENSION SUBCUTANEOUS at 09:55

## 2018-06-06 RX ADMIN — IBUPROFEN 800 MG: 800 TABLET ORAL at 12:14

## 2018-06-06 NOTE — ANESTHESIA POSTPROCEDURE EVALUATION
Patient: Ernie Jimenes    * No procedures listed *    Diagnosis:* No pre-op diagnosis entered *  Diagnosis Additional Information: Labor pain    Anesthesia Type:  Epidural    Note:  Anesthesia Post Evaluation         Comments:     S/P epidural for labor.   I or my partner was immediately available for management of this patient during epidural analgesia infusion.  Patient d/c'd.  After chart review:  VSS.  Doing well. Block resolved.  Minimal side effects easily managed w/ PRN meds. No apparent anesthetic complications. No follow-up required.    Hal Ramirez MD        Last vitals:  Vitals:    05/13/15 1545 05/13/15 2217 05/14/15 0830   BP: 121/79 120/78 127/82   Pulse:      Resp: 16 16 16   Temp: 97.8  F (36.6  C) 97.7  F (36.5  C) 97.9  F (36.6  C)   SpO2:            Electronically Signed By: Hal Ramirez MD  June 6, 2018  2:57 PM

## 2018-06-06 NOTE — DISCHARGE INSTRUCTIONS
Postpartum Vaginal Delivery Instructions    Follow up in clinic at 2 weeks and 6 weeks , Instructed to make appointment with OBGYN for [place]    Home care  [phone]      Activity       Ask family and friends for help when you need it.    Do not place anything in your vagina for 6 weeks.    You are not restricted on other activities, but take it easy for a few weeks to allow your body to recover from delivery.  You are able to do any activities you feel up to that point.    No driving until you have stopped taking your pain medications (usually two weeks after delivery).     Call your health care provider if you have any of these symptoms:       Increased pain, swelling, redness, or fluid around your stiches from an episiotomy or perineal tear.    A fever above 100.4 F (38 C) with or without chills when placing a thermometer under your tongue.    You soak a sanitary pad with blood within 1 hour, or you see blood clots larger than a golf ball.    Bleeding that lasts more than 6 weeks.    Vaginal discharge that smells bad.    Severe pain, cramping or tenderness in your lower belly area.    A need to urinate more frequently (use the toilet more often), more urgently (use the toilet very quickly), or it burns when you urinate.    Nausea and vomiting.    Redness, swelling or pain around a vein in your leg.    Problems breastfeeding or a red or painful area on your breast.    Chest pain and cough or are gasping for air.    Problems coping with sadness, anxiety, or depression.  If you have any concerns about hurting yourself or the baby, call your provider immediately.     You have questions or concerns after you return home.     Keep your hands clean:  Always wash your hands before touching your perineal area and stitches.  This helps reduce your risk of infection.  If your hands aren't dirty, you may use an alcohol hand-rub to clean your hands. Keep your nails clean and short.      Please make appointment with an OBGYN  physician for Colposcopy. This is a follow up from the abnormal pap smear early in the pregnancy.   Follow up appointments with Midwives 2 weeks and 6 weeks.

## 2018-06-06 NOTE — PLAN OF CARE
Problem: Patient Care Overview  Goal: Plan of Care/Patient Progress Review  Outcome: Adequate for Discharge Date Met: 06/06/18  Ibu for pain and bonding with baby. Very happy and excited about baby. DC info gone over and she will follow up as ordered. No further questions. Walked out to car with baby by RN.

## 2018-06-06 NOTE — PLAN OF CARE
Problem: Patient Care Overview  Goal: Plan of Care/Patient Progress Review  Outcome: Improving  Stable this shift. Motrin given so patient stays on top of pain. Bonding well with infant.

## 2018-06-06 NOTE — DISCHARGE SUMMARY
"Post Partum Note  SIGNIFICANT PROBLEMS:  Patient Active Problem List    Diagnosis Date Noted     Supervision of other normal pregnancy, antepartum 06/04/2018     Priority: Medium     Encounter for triage in pregnant patient 06/04/2018     Priority: Medium     Normal labor 06/04/2018     Priority: Medium     Rubella non-immune status, antepartum 12/08/2017     Priority: Medium     Pap smear with atypical squamous cells, cannot exclude high grade squamous intraepithelial lesion (ASC-H) 11/10/2017     Priority: Medium     02/17/05 NL pap  04/07/06 NL pap  07/02/07 NL pap  07/23/08 ASCUS pap +HPV #16 , pt. Is to schedule a colposcopy, pt. [facility] to [facility]  05/14/10 LSIL pap, pt. returned to [facility] for a colp.  10/14/14 NL pap, + HPV #16 HR, pt. Is pregnant per DR. Torres, pt. Is to have a postpartum colposcopy, pts. EDC 5-10-14.  05/27/15 Pt. Delivered on 5-12-15, will do a postpartum colposcopy  08/11/16 [place]: ECC negative. Plan: co-testing in one year.  11/10/17 ASC-H pap, + HR HPV # 16. 11w1d pregnant.  Plan: [place] postpartum, EDC of 6/7/18       Anxiety 07/14/2015     Priority: Medium     Personal history of tobacco use, presenting hazards to health 02/17/2005     Priority: Medium     Esophageal reflux 12/10/2003     Priority: Medium       INTERVAL HISTORY:  /69  Pulse 72  Temp 98.3  F (36.8  C) (Oral)  Resp 16  Ht 1.727 m (5' 8\")  Wt 89.4 kg (197 lb)  LMP 08/31/2017 (Exact Date)  Breastfeeding? Unknown  BMI 29.95 kg/m2  Pt stable, baby is rooming in  Breast feeding status:formula fed  Complications since 2 hours post delivery: None  Patient is tolerating acitivity well Voiding without difficulty, cramping is relieved by Ibuprophen, lochia is decreasing and patient denies clots.  Perineal pain is is relieved by Ibuprophen.  The perineum laceration is well approximated      Postpartum hemoglobin   Hemoglobin   Date Value Ref Range Status   06/06/2018 11.1 (L) 11.7 - 15.7 g/dL Final "     Blood type   Lab Results   Component Value Date    ABO A 2018       Lab Results   Component Value Date    RH Pos 2018     Rubella status No results found for: RUBELLAABIGG  History of depression: denies, states she had baby blues     ASSESSMENT/PLAN:  Normal postpartum exam   Stable Post-partum day #1  Complications:none and vaccination status Rubella non-immune, plan MMR   Plan d/c home today  RTC 2 weeks and 6 weeks , Instructed to make appointment with OBGYN for [place] d/t ASCH in pregnancy.   Teaching done: D/C Instructions: Nutrition/Activity, Engorgement Management, Birth Control Options, Warning Signs/When to Call: Excessive Bleeding, Infection, PP Depression, Kegals and Crunches, RTC Clinic for PP Appointment and PNV  Birthcontrol planned:IUD, undecided which kind   Current Discharge Medication List      START taking these medications    Details   senna-docusate (SENOKOT-S;PERICOLACE) 8.6-50 MG per tablet Take 1 tablet by mouth 2 times daily  Qty: 100 tablet, Refills: 0    Associated Diagnoses:  (normal spontaneous vaginal delivery)         CONTINUE these medications which have NOT CHANGED    Details   Prenatal Vit-Fe Fumarate-FA (PRENATAL MULTIVITAMIN PLUS IRON) 27-0.8 MG TABS per tablet Take 1 tablet by mouth daily      DiphenhydrAMINE HCl (BENADRYL PO) Take by mouth nightly as needed           KELLY [place] CNM

## 2018-06-06 NOTE — PLAN OF CARE
Problem: Patient Care Overview  Goal: Plan of Care/Patient Progress Review  Outcome: Improving  Stable patient, meeting expected goals. Independent with cares and breastfeeding, taking Ibuprofen for discomfort. Bonding well with infant, desires 24 hr discharge.

## 2018-06-10 ENCOUNTER — HEALTH MAINTENANCE LETTER (OUTPATIENT)
Age: 33
End: 2018-06-10

## 2018-07-15 ENCOUNTER — HEALTH MAINTENANCE LETTER (OUTPATIENT)
Age: 33
End: 2018-07-15

## 2018-07-18 ENCOUNTER — PRENATAL OFFICE VISIT (OUTPATIENT)
Dept: OBGYN | Facility: CLINIC | Age: 33
End: 2018-07-18
Payer: COMMERCIAL

## 2018-07-18 VITALS
HEIGHT: 68 IN | DIASTOLIC BLOOD PRESSURE: 80 MMHG | WEIGHT: 181 LBS | BODY MASS INDEX: 27.43 KG/M2 | SYSTOLIC BLOOD PRESSURE: 108 MMHG

## 2018-07-18 PROBLEM — Z28.39 RUBELLA NON-IMMUNE STATUS, ANTEPARTUM: Status: RESOLVED | Noted: 2017-12-08 | Resolved: 2018-07-18

## 2018-07-18 PROBLEM — Z34.80 SUPERVISION OF OTHER NORMAL PREGNANCY, ANTEPARTUM: Status: RESOLVED | Noted: 2018-06-04 | Resolved: 2018-07-18

## 2018-07-18 PROBLEM — O09.899 RUBELLA NON-IMMUNE STATUS, ANTEPARTUM: Status: RESOLVED | Noted: 2017-12-08 | Resolved: 2018-07-18

## 2018-07-18 PROCEDURE — 99207 ZZC POST PARTUM EXAM: CPT | Performed by: ADVANCED PRACTICE MIDWIFE

## 2018-07-18 RX ORDER — ETONOGESTREL AND ETHINYL ESTRADIOL VAGINAL RING .015; .12 MG/D; MG/D
RING VAGINAL
Qty: 3 EACH | Refills: 3 | Status: SHIPPED | OUTPATIENT
Start: 2018-07-18 | End: 2019-06-24

## 2018-07-18 NOTE — MR AVS SNAPSHOT
"              After Visit Summary   7/18/2018    Ernie Jimenes    MRN: [id number]           Patient Information     Date Of Birth          1985        Visit Information        Provider Department      7/18/2018 10:30 AM Leelee Forrester APRN CNM [facility]        Today's Diagnoses     Postpartum care and examination    -  1       Follow-ups after your visit        Follow-up notes from your care team     Return if symptoms worsen or fail to improve.      Who to contact     If you have questions or need follow up information about today's clinic visit or your schedule please contact [facility] directly at [phone].  Normal or non-critical lab and imaging results will be communicated to you by MyChart, letter or phone within 4 business days after the clinic has received the results. If you do not hear from us within 7 days, please contact the clinic through MyChart or phone. If you have a critical or abnormal lab result, we will notify you by phone as soon as possible.  Submit refill requests through Revel Touch or call your pharmacy and they will forward the refill request to us. Please allow 3 business days for your refill to be completed.          Additional Information About Your Visit        Care EveryWhere ID     This is your Care EveryWhere ID. This could be used by other organizations to access your [place] medical records  [id number]        Your Vitals Were     Height Last Period Breastfeeding? BMI (Body Mass Index)          5' 8\" (1.727 m) 08/31/2017 (Exact Date) No 27.52 kg/m2         Blood Pressure from Last 3 Encounters:   07/18/18 108/80   06/06/18 103/69   06/04/18 122/80    Weight from Last 3 Encounters:   07/18/18 181 lb (82.1 kg)   06/04/18 197 lb (89.4 kg)   06/04/18 197 lb 6.4 oz (89.5 kg)              Today, you had the following     No orders found for display         Today's Medication Changes          These changes are accurate as of " 7/18/18 11:09 AM.  If you have any questions, ask your nurse or doctor.               Start taking these medicines.        Dose/Directions    etonogestrel-ethinyl estradiol 0.12-0.015 MG/24HR vaginal ring   Commonly known as:  NUVARING   Used for:  Postpartum care and examination   Started by:  Leelee Forrester APRN CNM        Insert 1 ring vaginally every 21 days then remove for 1 week then repeat with new ring.   Quantity:  3 each   Refills:  3            Where to get your medicines      These medications were sent to [facility] [address] [facility] [address] AT [facility] [address]  [address], [facility] [address]     Phone:  [phone]     etonogestrel-ethinyl estradiol 0.12-0.015 MG/24HR vaginal ring                Primary Care Provider Office Phone # Fax #    Kacykait Olivo [phone]       [facility] [address] [place] [address]        Equal Access to Services     [facility]: Hadii aad ku hadasho Soomaali, waaxda luqadaha, qaybta kaalmada adeegyada, veronique jackson. So [facility] [phone].    ATENCIÓN: Si habla español, tiene a farrell disposición servicios gratuitos de asistencia lingüística. [facility] [phone].    We comply with applicable federal civil rights laws and [place] laws. We do not discriminate on the basis of race, color, national origin, age, disability, sex, sexual orientation, or gender identity.            Thank you!     Thank you for choosing [facility]  for your care. Our goal is always to provide you with excellent care. Hearing back from our patients is one way we can continue to improve our services. Please take a few minutes to complete the written survey that you may receive in the mail after your visit with us. Thank you!             Your Updated Medication List - Protect others around you: Learn how to safely use, store and throw away  your medicines at www.disposemymeds.org.          This list is accurate as of 7/18/18 11:09 AM.  Always use your most recent med list.                   Brand Name Dispense Instructions for use Diagnosis    etonogestrel-ethinyl estradiol 0.12-0.015 MG/24HR vaginal ring    NUVARING    3 each    Insert 1 ring vaginally every 21 days then remove for 1 week then repeat with new ring.    Postpartum care and examination

## 2018-07-18 NOTE — PROGRESS NOTES
Midwife Postpartum 6 Week Visit    Ernie Jimenes is a 32 year old here for a postpartum checkup.     Delivery date was 18. She had a  of a viable boy, weight 6 pounds 15.5 oz., with no complications      Since delivery, she has not been breast feeding.  She has not had any signs of infection, her lochia stopped after 3 weeks.  She has not had other complications.      She is voiding and having bowel movements without difficulty.       Contraception was discussed and patient desires ring.   She  has not had intercourse since delivery.   She complains of No  perineal discomfort.     Mood is Stable  Patient screened for postpartum depression.   Depression Rating was:   Last PHQ-9 score on record =   PHQ-9 SCORE 2015   Total Score 0     Last GAD7 score on record = No flowsheet data found.  Alcohol Score = 0    ROS:  CONSTITUTIONAL: NEGATIVE for fever, chills, change in weight  INTEGUMENTARU/SKIN: NEGATIVE for worrisome rashes, moles or lesions  EYES: NEGATIVE for vision changes or irritation  ENT: NEGATIVE for ear, mouth and throat problems  RESP: NEGATIVE for significant cough or SOB  BREAST: NEGATIVE for masses, tenderness or discharge  CV: NEGATIVE for chest pain, palpitations or peripheral edema  GI: NEGATIVE for nausea, abdominal pain, heartburn, or change in bowel habits  : NEGATIVE for unusual urinary or vaginal symptoms. Periods are regular.  MUSCULOSKELETAL: NEGATIVE for significant arthralgias or myalgia  NEURO: NEGATIVE for weakness, dizziness or paresthesias  PSYCHIATRIC: NEGATIVE for changes in mood or affect    No current outpatient prescriptions on file.  No current facility-administered medications for this visit.     Facility-Administered Medications Ordered in Other Visits:      fentaNYL (SUBLIMAZE) injection, , EPIDURAL, PRN, Charles Bennett MD, 100 mcg at 05/12/15 0814     ROPivacaine (NAROPIN) injection, , , PRN, Charles Bennett MD, 6 mL at 05/12/15 0938.   Obstetric  "History       T2      L2     SAB0   TAB1   Ectopic0   Multiple0   Live Births2       # Outcome Date GA Lbr Shyam/2nd Weight Sex Delivery Anes PTL Lv   3 Term 18 39w5d 04:11 / 00:13 6 lb 15.5 oz (3.16 kg) M Vag-Spont EPI N AMINAH      Name: DIDI CARRILLO      Apgar1:  9                Apgar5: 9   2 Term 05/12/15 40w3d 07:21 / 04:06 6 lb 11.2 oz (3.039 kg) M Vag-Spont EPI N AMINAH      Name: Carlos      Apgar1:  9                Apgar5: 9   1 TAB  8w0d               Last pap:    Lab Results   Component Value Date    PAP ASC-H 11/10/2017     Hgb in hospital was 11.1    EXAM:  /80  Ht 5' 8\" (1.727 m)  Wt 181 lb (82.1 kg)  LMP 2017 (Exact Date)  Breastfeeding? No  BMI 27.52 kg/m2  BMI: Body mass index is 27.52 kg/(m^2).  Constitutional: healthy, alert and no distress  Neck: symmetrical, thyroid normal size, no masses present, no lymphadenopathy present.   Breast:normal without masses, tenderness or nipple discharge and no palpable axillary masses or adenopathy, deferred, patient lactating.  Abdomen: soft, non-tender, diastasis 1 FB's    PELVIC EXAM:  Vulva: No lesions, well healed, BUS WNL, no tenderness  Vagina: Moist, pink, discharge normal  well rugated, no lesions  Cervix:smooth, pink, no visible lesions  Uterus: Involuted to normal size, non-tender, no masses palpated  Ovaries: No masses palpated  Pelvic tone: good  Rectal exam: deferred      ASSESSMENT:   Normal postpartum exam after .  (Z39.2) Postpartum care and examination  (primary encounter diagnosis)  Plan: etonogestrel-ethinyl estradiol (NUVARING)         0.12-0.015 MG/24HR vaginal ring    PLAN:  Results for orders placed or performed during the hospital encounter of 18   Treponema Abs w Reflex to RPR and Titer   Result Value Ref Range    Treponema Antibodies Nonreactive NR^Nonreactive   CBC with platelets differential   Result Value Ref Range    WBC 15.5 (H) 4.0 - 11.0 10e9/L    RBC Count 4.29 3.8 - 5.2 " 10e12/L    Hemoglobin 13.5 11.7 - 15.7 g/dL    Hematocrit 38.2 35.0 - 47.0 %    MCV 89 78 - 100 fl    MCH 31.5 26.5 - 33.0 pg    MCHC 35.3 31.5 - 36.5 g/dL    RDW 12.9 10.0 - 15.0 %    Platelet Count 210 150 - 450 10e9/L    Diff Method Automated Method     % Neutrophils 89.6 %    % Lymphocytes 5.9 %    % Monocytes 3.9 %    % Eosinophils 0.1 %    % Basophils 0.2 %    % Immature Granulocytes 0.3 %    Nucleated RBCs 0 0 /100    Absolute Neutrophil 13.9 (H) 1.6 - 8.3 10e9/L    Absolute Lymphocytes 0.9 0.8 - 5.3 10e9/L    Absolute Monocytes 0.6 0.0 - 1.3 10e9/L    Absolute Eosinophils 0.0 0.0 - 0.7 10e9/L    Absolute Basophils 0.0 0.0 - 0.2 10e9/L    Abs Immature Granulocytes 0.1 0 - 0.4 10e9/L    Absolute Nucleated RBC 0.0    Hemoglobin   Result Value Ref Range    Hemoglobin 11.1 (L) 11.7 - 15.7 g/dL   ABO and Rh   Result Value Ref Range    ABO A     RH(D) Pos     Specimen Expires 06/08/2018        Return as needed or at time of next expected pap, pelvic, or breast exam.  Teaching: exercise and birth control  Family Planning:ring  Encourage Kegels and abdominal exercise.  Continue a multivitamin/prenatal supplement, especially if breastfeeding.  Pap smear was not obtained today.  Postpartum Hgb was not done today.    GDM:  Fasting and 2hr GCT needed:  No  If yes, remind need for annual fasting BG and nutrition/exercise recommendations.    Discussed that patient is due for colposcopy. She will schedule at her convenience. Patient considering LARC. Will start with Nuva Ring and consider options. Rx: Nuva Ring sent to pharmacy. Educated on use/MOA/SE/risk. Patient verbalizes understanding and agrees with plan.     Leelee Forrester CNM

## 2018-07-19 ASSESSMENT — PATIENT HEALTH QUESTIONNAIRE - PHQ9: SUM OF ALL RESPONSES TO PHQ QUESTIONS 1-9: 1

## 2018-08-15 DIAGNOSIS — Z53.9 DIAGNOSIS NOT YET DEFINED: Primary | ICD-10-CM

## 2018-08-20 PROBLEM — R87.610 PAP SMEAR WITH ATYPICAL SQUAMOUS CELLS, CANNOT EXCLUDE HIGH GRADE SQUAMOUS INTRAEPITHELIAL LESION (ASC-H): Status: ACTIVE | Noted: 2017-11-10

## 2018-10-16 ENCOUNTER — OFFICE VISIT (OUTPATIENT)
Dept: OBGYN | Facility: CLINIC | Age: 33
End: 2018-10-16
Payer: COMMERCIAL

## 2018-10-16 VITALS — WEIGHT: 179.1 LBS | BODY MASS INDEX: 27.23 KG/M2 | SYSTOLIC BLOOD PRESSURE: 134 MMHG | DIASTOLIC BLOOD PRESSURE: 84 MMHG

## 2018-10-16 DIAGNOSIS — Z23 NEED FOR PROPHYLACTIC VACCINATION AND INOCULATION AGAINST INFLUENZA: Primary | ICD-10-CM

## 2018-10-16 DIAGNOSIS — B97.7 HIGH RISK HPV INFECTION: ICD-10-CM

## 2018-10-16 PROCEDURE — 88305 TISSUE EXAM BY PATHOLOGIST: CPT | Performed by: OBSTETRICS & GYNECOLOGY

## 2018-10-16 PROCEDURE — 90686 IIV4 VACC NO PRSV 0.5 ML IM: CPT | Performed by: OBSTETRICS & GYNECOLOGY

## 2018-10-16 PROCEDURE — 90471 IMMUNIZATION ADMIN: CPT | Performed by: OBSTETRICS & GYNECOLOGY

## 2018-10-16 PROCEDURE — 57456 ENDOCERV CURETTAGE W/SCOPE: CPT | Performed by: OBSTETRICS & GYNECOLOGY

## 2018-10-16 NOTE — PROGRESS NOTES
SUBJECTIVE:: Ernie Jimenes, is a 32 year old female, who had a recent ASCUS-H pap.  HPV - HPV 16 positive.  Yes prior history of abnormal pap.Here today for colposcopy. Discussed indication, risks of infection and bleeding.    Procedure: Cervix is stained with acetic acid and veiwed colposcopically. Squamocolumner junction is  visualized in it's entirity. no visible lesions, no mosaicism, no punctation and no abnormal vasculature . Biopsy done No. Endocervical curretage Done    Impression: colposcopy adequate and Normal cervix  Plan: Await the results of the biopsies.

## 2018-10-16 NOTE — MR AVS SNAPSHOT
"              After Visit Summary   10/16/2018    Ernie Jimenes    MRN: [id number]           Patient Information     Date Of Birth          1985        Visit Information        Provider Department      10/16/2018 1:30 PM Manish Torres MD; [facility] PROCEDURE OB/GYN [facility]        Today's Diagnoses     Need for prophylactic vaccination and inoculation against influenza    -  1    High risk HPV infection           Follow-ups after your visit        Who to contact     If you have questions or need follow up information about today's clinic visit or your schedule please contact [facility] directly at [phone].  Normal or non-critical lab and imaging results will be communicated to you by MyChart, letter or phone within 4 business days after the clinic has received the results. If you do not hear from us within 7 days, please contact the clinic through MyChart or phone. If you have a critical or abnormal lab result, we will notify you by phone as soon as possible.  Submit refill requests through ChannelMeter or call your pharmacy and they will forward the refill request to us. Please allow 3 business days for your refill to be completed.          Additional Information About Your Visit        MyChart Information     ChannelMeter lets you send messages to your doctor, view your test results, renew your prescriptions, schedule appointments and more. To sign up, go to [email or web address] . Click on \"Log in\" on the left side of the screen, which will take you to the Welcome page. Then click on \"Sign up Now\" on the right side of the page.     You will be asked to enter the access code listed below, as well as some personal information. Please follow the directions to create your username and password.     Your access code is: [email or web address]  Expires: 2019  5:35 PM     Your access code will  in 90 days. If you need help or a new code, please call your " [facility] or [phone].        Care EveryWhere ID     This is your Care EveryWhere ID. This could be used by other organizations to access your [place] medical records  [id number]        Your Vitals Were     Last Period Breastfeeding? BMI (Body Mass Index)             09/23/2018 (Approximate) No 27.23 kg/m2          Blood Pressure from Last 3 Encounters:   10/16/18 134/84   07/18/18 108/80   06/06/18 103/69    Weight from Last 3 Encounters:   10/16/18 179 lb 1.6 oz (81.2 kg)   07/18/18 181 lb (82.1 kg)   06/04/18 197 lb (89.4 kg)              We Performed the Following     COLP CERVIX/UPPER VAGINA W ENDOCERV CURETT     FLU VACCINE, SPLIT VIRUS, IM (QUADRIVALENT) [54043]- >3 YRS     Surgical pathology exam     Vaccine Administration, Initial [[id number]]        Primary Care Provider Office Phone # Fax #    Kacy RUMA Olivo [phone]       [facility] [address] [place] [address]        Equal Access to Services     [facility]: Hadii aad ku hadasho Soomaali, waaxda luqadaha, qaybta kaalmada adebonitayapeter, veronique degroot . So [facility] [phone].    ATENCIÓN: Si habla español, tiene a farrell disposición servicios gratuitos de asistencia lingüística. Phuong al [phone].    We comply with applicable federal civil rights laws and [place] laws. We do not discriminate on the basis of race, color, national origin, age, disability, sex, sexual orientation, or gender identity.            Thank you!     Thank you for choosing [facility]  for your care. Our goal is always to provide you with excellent care. Hearing back from our patients is one way we can continue to improve our services. Please take a few minutes to complete the written survey that you may receive in the mail after your visit with us. Thank you!             Your Updated Medication List - Protect others around you: Learn how to safely use, store and throw away your  medicines at www.disposemymeds.org.          This list is accurate as of 10/16/18 11:59 PM.  Always use your most recent med list.                   Brand Name Dispense Instructions for use Diagnosis    etonogestrel-ethinyl estradiol 0.12-0.015 MG/24HR vaginal ring    NUVARING    3 each    Insert 1 ring vaginally every 21 days then remove for 1 week then repeat with new ring.    Postpartum care and examination

## 2018-10-16 NOTE — NURSING NOTE
"Chief Complaint   Patient presents with     Colposcopy       Initial /84  Wt 179 lb 1.6 oz (81.2 kg)  LMP 2018 (Approximate)  Breastfeeding? No  BMI 27.23 kg/m2 Estimated body mass index is 27.23 kg/(m^2) as calculated from the following:    Height as of 18: 5' 8\" (1.727 m).    Weight as of this encounter: 179 lb 1.6 oz (81.2 kg).  BP completed using cuff size: regular        The following HM Due: pap smear  Vaccinations: flu shot      orders have been placed   Corinna Nails CMA           "

## 2018-10-16 NOTE — PROGRESS NOTES

## 2018-10-18 LAB — COPATH REPORT: NORMAL

## 2018-11-14 ENCOUNTER — OFFICE VISIT (OUTPATIENT)
Dept: OBGYN | Facility: CLINIC | Age: 33
End: 2018-11-14
Payer: COMMERCIAL

## 2018-11-14 VITALS — DIASTOLIC BLOOD PRESSURE: 88 MMHG | SYSTOLIC BLOOD PRESSURE: 140 MMHG | WEIGHT: 178.6 LBS | BODY MASS INDEX: 27.16 KG/M2

## 2018-11-14 DIAGNOSIS — N87.1 DYSPLASIA OF CERVIX, HIGH GRADE CIN 2: ICD-10-CM

## 2018-11-14 PROCEDURE — 88307 TISSUE EXAM BY PATHOLOGIST: CPT | Performed by: OBSTETRICS & GYNECOLOGY

## 2018-11-14 PROCEDURE — 57460 BX OF CERVIX W/SCOPE LEEP: CPT | Performed by: OBSTETRICS & GYNECOLOGY

## 2018-11-14 NOTE — NURSING NOTE
"Chief Complaint   Patient presents with     Leep       Initial There were no vitals taken for this visit. Estimated body mass index is 27.23 kg/(m^2) as calculated from the following:    Height as of 18: 5' 8\" (1.727 m).    Weight as of 10/16/18: 179 lb 1.6 oz (81.2 kg).  BP completed using cuff size: regular    Questioned patient about current smoking habits.  Pt. quit smoking some time ago.          The following HM Due: NONE      Luli Sullivan, JENNIFER on 2018 at 10:11 AM     "

## 2018-11-14 NOTE — MR AVS SNAPSHOT
"              After Visit Summary   2018    Ernie Jimenes    MRN: [id number]           Patient Information     Date Of Birth          1985        Visit Information        Provider Department      2018 10:15 AM Manish Torres MD; [place] PROC RM 2 [facility]        Today's Diagnoses     Dysplasia of cervix, high grade MANUEL 2           Follow-ups after your visit        Who to contact     If you have questions or need follow up information about today's clinic visit or your schedule please contact [facility] directly at [phone].  Normal or non-critical lab and imaging results will be communicated to you by Brandsclubhart, letter or phone within 4 business days after the clinic has received the results. If you do not hear from us within 7 days, please contact the clinic through Brandsclubhart or phone. If you have a critical or abnormal lab result, we will notify you by phone as soon as possible.  Submit refill requests through Blue Apron or call your pharmacy and they will forward the refill request to us. Please allow 3 business days for your refill to be completed.          Additional Information About Your Visit        MyChart Information     Blue Apron lets you send messages to your doctor, view your test results, renew your prescriptions, schedule appointments and more. To sign up, go to [email or web address] . Click on \"Log in\" on the left side of the screen, which will take you to the Welcome page. Then click on \"Sign up Now\" on the right side of the page.     You will be asked to enter the access code listed below, as well as some personal information. Please follow the directions to create your username and password.     Your access code is: [email or web address]  Expires: 2019  4:35 PM     Your access code will  in 90 days. If you need help or a new code, please call your [facility] or [phone].        Care EveryWhere ID     This is your Care EveryWhere " ID. This could be used by other organizations to access your [place] medical records  [id number]        Your Vitals Were     Breastfeeding? BMI (Body Mass Index)                No 27.16 kg/m2           Blood Pressure from Last 3 Encounters:   11/14/18 140/88   10/16/18 134/84   07/18/18 108/80    Weight from Last 3 Encounters:   11/14/18 178 lb 9.6 oz (81 kg)   10/16/18 179 lb 1.6 oz (81.2 kg)   07/18/18 181 lb (82.1 kg)              We Performed the Following     COLP CERVIX/UPPER VAGINA W LOOP ELEC BX CERVIX     Surgical pathology exam        Primary Care Provider Office Phone # Fax #    Kacy Olivo [phone]       [facility] [place] [address]        Equal Access to Services     WINNIE ORELLANA : Hadii aad ku hadasho Soomaali, waaxda luqadaha, qaybta kaalmada adeegyada, veronique sandoval hayrachel degroot . So [facility] [phone].    ATENCIÓN: Si habla español, tiene a farrell disposición servicios gratuitos de asistencia lingüística. Phuong al [phone].    We comply with applicable federal civil rights laws and [place] laws. We do not discriminate on the basis of race, color, national origin, age, disability, sex, sexual orientation, or gender identity.            Thank you!     Thank you for choosing [facility]  for your care. Our goal is always to provide you with excellent care. Hearing back from our patients is one way we can continue to improve our services. Please take a few minutes to complete the written survey that you may receive in the mail after your visit with us. Thank you!             Your Updated Medication List - Protect others around you: Learn how to safely use, store and throw away your medicines at www.disposemymeds.org.          This list is accurate as of 11/14/18 11:59 PM.  Always use your most recent med list.                   Brand Name Dispense Instructions for use Diagnosis    etonogestrel-ethinyl estradiol  0.12-0.015 MG/24HR vaginal ring    NUVARING    3 each    Insert 1 ring vaginally every 21 days then remove for 1 week then repeat with new ring.    Postpartum care and examination

## 2018-11-14 NOTE — PROGRESS NOTES
SUBJECTIVE:: Ernie Jimenes,is a 32 year old female, here for LEEP procedure. Recent ASCUS-H, positive HPV PAP.Subsequent colposcopy path report noted , ECC positive, MANUEL II. Discussed risks of procedure such as infection, bleeding, cervical incompetence.    Procedure: Cervix is stained with acetic acid and viewed colposcopically. Squamocolumner junction is  visualized in it's entirety. no visible lesions, no mosaicism, no punctation and no abnormal vasculature visualized. Cervix injected with 1% lidocaine with epinephrine. Large wire loop used for superficial specimen,  Small loop for deepest specimen. Hemostasis with ball cautery and monsel's solution.     Impression: LEEP for colposcopy adequate, ECC positive    Plan: Await the results of the biopsies.

## 2018-11-15 LAB — COPATH REPORT: NORMAL

## 2019-06-24 ENCOUNTER — OFFICE VISIT (OUTPATIENT)
Dept: OBGYN | Facility: CLINIC | Age: 34
End: 2019-06-24
Payer: COMMERCIAL

## 2019-06-24 VITALS — DIASTOLIC BLOOD PRESSURE: 80 MMHG | SYSTOLIC BLOOD PRESSURE: 132 MMHG | BODY MASS INDEX: 26.3 KG/M2 | WEIGHT: 173 LBS

## 2019-06-24 DIAGNOSIS — D06.9 SEVERE CERVICAL DYSPLASIA, HISTOLOGICALLY CONFIRMED: ICD-10-CM

## 2019-06-24 DIAGNOSIS — Z01.419 ENCOUNTER FOR GYNECOLOGICAL EXAMINATION WITHOUT ABNORMAL FINDING: Primary | ICD-10-CM

## 2019-06-24 DIAGNOSIS — Z30.44 ENCOUNTER FOR SURVEILLANCE OF VAGINAL RING HORMONAL CONTRACEPTIVE DEVICE: ICD-10-CM

## 2019-06-24 PROBLEM — N87.1 DYSPLASIA OF CERVIX, HIGH GRADE CIN 2: Status: ACTIVE | Noted: 2017-11-10

## 2019-06-24 PROCEDURE — 88175 CYTOPATH C/V AUTO FLUID REDO: CPT | Performed by: OBSTETRICS & GYNECOLOGY

## 2019-06-24 PROCEDURE — 88305 TISSUE EXAM BY PATHOLOGIST: CPT | Performed by: OBSTETRICS & GYNECOLOGY

## 2019-06-24 PROCEDURE — 99395 PREV VISIT EST AGE 18-39: CPT | Mod: 25 | Performed by: OBSTETRICS & GYNECOLOGY

## 2019-06-24 PROCEDURE — 57505 ENDOCERVICAL CURETTAGE: CPT | Performed by: OBSTETRICS & GYNECOLOGY

## 2019-06-24 PROCEDURE — G0476 HPV COMBO ASSAY CA SCREEN: HCPCS | Performed by: OBSTETRICS & GYNECOLOGY

## 2019-06-24 RX ORDER — ETONOGESTREL AND ETHINYL ESTRADIOL VAGINAL RING .015; .12 MG/D; MG/D
RING VAGINAL
Qty: 3 EACH | Refills: 3 | Status: SHIPPED | OUTPATIENT
Start: 2019-06-24 | End: 2020-01-03

## 2019-06-24 NOTE — PROGRESS NOTES
SUBJECTIVE:   CC: Ernie Jimenes is an 33 year old woman who presents for preventive health visit.     Healthy Habits:    Do you get at least three servings of calcium containing foods daily (dairy, green leafy vegetables, etc.)? yes    Amount of exercise or daily activities, outside of work: 3 day(s) per week    Problems taking medications regularly No    Medication side effects: No    Have you had an eye exam in the past two years? no    Do you see a dentist twice per year? yes    Do you have sleep apnea, excessive snoring or daytime drowsiness?no      Pt also here to f/u MANUEL 3.  Pt had Pap ASC-H w/ Pos HPV 16 (11/10/17), Colpo - MANUEL 2 (10/16/18), then LEEP - MANUEL 2-3 on ectoCx LEEP with positive margins, endoCx LEEP showed MANUEL 1 but endoCx margin neg.  No ECC done at that time.  She needs f/u Pap/HPV co-test and ECC per ASCCP guidelines for prior LEEP with HGSIL present with positive margins.      Today's PHQ-2 Score:   PHQ-2 ( 1999 Pfizer) 6/30/2015   Q1: Little interest or pleasure in doing things 0   Q2: Feeling down, depressed or hopeless 0   PHQ-2 Score 0       Abuse: Current or Past(Physical, Sexual or Emotional)- No  Do you feel safe in your environment? Yes    Social History     Tobacco Use     Smoking status: Former Smoker     Packs/day: 0.25     Years: 4.00     Pack years: 1.00     Types: Cigarettes     Smokeless tobacco: Never Used   Substance Use Topics     Alcohol use: No     If you drink alcohol do you typically have >3 drinks per day or >7 drinks per week? No                     Reviewed orders with patient.  Reviewed health maintenance and updated orders accordingly - Yes  Patient Active Problem List   Diagnosis     Esophageal reflux     Personal history of tobacco use, presenting hazards to health     Severe cervical dysplasia, histologically confirmed     Anxiety     Encounter for triage in pregnant patient     Normal labor     Past Surgical History:   Procedure Laterality Date      COLPOSCOPY,BX CERVIX/ENDOCERV CURR  8/08    basically nl, per Dr. Juanita osuna     DENTAL SURGERY  2003     HC CREATE EARDRUM OPENING,GEN ANESTH  age 1    P.E. Tubes     LEEP TX, CERVICAL  11/14/2018    MANUEL 3     NO         Social History     Tobacco Use     Smoking status: Former Smoker     Packs/day: 0.25     Years: 4.00     Pack years: 1.00     Types: Cigarettes     Smokeless tobacco: Never Used   Substance Use Topics     Alcohol use: No     Family History   Problem Relation Age of Onset     Lipids Mother      Depression Mother      Hypertension Mother      Thyroid Disease Mother      Breast Cancer Maternal Grandmother      Breast Cancer Maternal Aunt            Mammogram not appropriate for this patient based on age.    Pertinent mammograms are reviewed under the imaging tab.  History of abnormal Pap smear: YES - other categories - see link Cervical Cytology Screening Guidelines  PAP / HPV Latest Ref Rng & Units 11/10/2017 10/14/2014 5/14/2010   PAP - ASC-H(A) NIL LSIL(A)   HPV 16 DNA NEG:Negative Positive(A) - -   HPV 18 DNA NEG:Negative Negative - -   OTHER HR HPV NEG:Negative Negative - -     Reviewed and updated as needed this visit by clinical staff  Tobacco  Allergies  Meds  Med Hx  Surg Hx  Fam Hx  Soc Hx        Reviewed and updated as needed this visit by Provider  Tobacco  Allergies  Meds  Med Hx  Surg Hx  Fam Hx  Soc Hx       Past Medical History:   Diagnosis Date     +++ OUTSIDE RECORDS SCAN  +++      Anxiety 7/14/2015     ASCUS on Pap smear 7/08    hpv 16 high risk, s/p colposcopy ob/gyn [place] basically nl     Esophageal reflux      History of colposcopy with cervical biopsy 08/11/16     neg     LSIL (low grade squamous intraepithelial lesion) on Pap smear 05/14/10     Personal history of tobacco use, presenting hazards to health       Past Surgical History:   Procedure Laterality Date     COLPOSCOPY,BX CERVIX/ENDOCERV CURR  8/08    basically nl, per Dr. Juanita osuna      DENTAL SURGERY       HC CREATE EARDRUM OPENING,GEN ANESTH  age 1    P.E. Tubes     LEEP TX, CERVICAL  2018    MANUEL 3     NO       OB History    Para Term  AB Living   3 2 2 0 1 2   SAB TAB Ectopic Multiple Live Births   0 1 0 0 2      # Outcome Date GA Lbr Shyam/2nd Weight Sex Delivery Anes PTL Lv   3 Term 17 39w5d 04:11 / 00:13 3.16 kg (6 lb 15.5 oz) M Vag-Spont EPI N AMINAH      Name: DIDI CARRILLO INO      Apgar1: 9  Apgar5: 9   2 Term 05/12/15 40w3d 07:21 / 04:06 3.039 kg (6 lb 11.2 oz) M Vag-Spont EPI N AMINAH      Name: Carlos      Apgar1: 9  Apgar5: 9   1 TAB  8w0d              ROS:  CONSTITUTIONAL: NEGATIVE for fever, chills, change in weight  INTEGUMENTARU/SKIN: NEGATIVE for worrisome rashes, moles or lesions  EYES: NEGATIVE for vision changes or irritation  ENT: NEGATIVE for ear, mouth and throat problems  RESP: NEGATIVE for significant cough or SOB  BREAST: NEGATIVE for masses, tenderness or discharge  CV: NEGATIVE for chest pain, palpitations or peripheral edema  GI: NEGATIVE for nausea, abdominal pain, heartburn, or change in bowel habits  : NEGATIVE for unusual urinary or vaginal symptoms. Periods are regular.  MUSCULOSKELETAL: NEGATIVE for significant arthralgias or myalgia  NEURO: NEGATIVE for weakness, dizziness or paresthesias  PSYCHIATRIC: NEGATIVE for changes in mood or affect    OBJECTIVE:   /80   Wt 78.5 kg (173 lb)   BMI 26.30 kg/m    EXAM:  GENERAL: healthy, alert and no distress  EYES: Eyes grossly normal to inspection, PERRL and conjunctivae and sclerae normal  HENT: ear canals and TM's normal, nose and mouth without ulcers or lesions  NECK: no adenopathy, no asymmetry, masses, or scars and thyroid normal to palpation  RESP: lungs clear to auscultation - no rales, rhonchi or wheezes  BREAST: normal without masses, tenderness or nipple discharge and no palpable axillary masses or adenopathy  CV: regular rate and rhythm, normal S1 S2, no S3 or S4, no  murmur, click or rub, no peripheral edema and peripheral pulses strong  ABDOMEN: soft, nontender, no hepatosplenomegaly, no masses and bowel sounds normal   (female): normal female external genitalia, normal urethral meatus, vaginal mucosa pink, moist, well rugated, and normal cervix/adnexa/uterus without masses or discharge, Pap/HPV co-test done.  ECC done as noted below.  MS: no gross musculoskeletal defects noted, no edema  SKIN: no suspicious lesions or rashes  NEURO: Normal strength and tone, mentation intact and speech normal  PSYCH: mentation appears normal, affect normal/bright    Diagnostic Test Results:  Labs reviewed in Epic      Procedure:  Endocervical curettage  Pt understands the indication for this procedure based on ASCCP guidelines for prior HGSIL on LEEP specimen margins.  She gave informed consent.  After Pap/HPV co-test done, endocervical curettage was performed using a small endocervical curette.  Specimen sent to pathology.  Cervix hemostatic at end and patient tolerated well.        ASSESSMENT/PLAN:       ICD-10-CM    1. Encounter for gynecological examination without abnormal finding Z01.419    2. Severe cervical dysplasia, histologically confirmed D06.9 ENDOCERVICAL CURETTAGE     HPV High Risk Types DNA Cervical     Pap imaged thin layer diagnostic with HPV (select HPV order below)     Surgical pathology exam   3. Encounter for surveillance of vaginal ring hormonal contraceptive device Z30.44 etonogestrel-ethinyl estradiol (NUVARING) 0.12-0.015 MG/24HR vaginal ring       COUNSELING:   Reviewed preventive health counseling, as reflected in patient instructions       Contraception       F/U plan depending on Pap/HPV and ECC results from today.  If any positive, will need colposcopy.  If all negative, repeat Pap/HPV cotest with ECC in 4-6 months.   Of note, Pt does possibly desire another pregnancy, so if MANUEL 2-3 recurs, would probably try another LEEP instead of a CKC to try to preserve her  "cervical structure as much as possible.  Her cervix is not that large on exam.  Only would resort to CKC if CIS is found on Bx.    Estimated body mass index is 26.3 kg/m  as calculated from the following:    Height as of 7/18/18: 1.727 m (5' 8\").    Weight as of this encounter: 78.5 kg (173 lb).         reports that she has quit smoking. Her smoking use included cigarettes. She has a 1.00 pack-year smoking history. She has never used smokeless tobacco.      Counseling Resources:  ATP IV Guidelines  Pooled Cohorts Equation Calculator  Breast Cancer Risk Calculator  FRAX Risk Assessment  ICSI Preventive Guidelines  Dietary Guidelines for Americans, 2010  USDA's MyPlate  ASA Prophylaxis  Lung CA Screening    Edmundo Driscoll MD  [facility]  "

## 2019-06-24 NOTE — NURSING NOTE
"Chief Complaint   Patient presents with     Physical       Initial /80   Wt 78.5 kg (173 lb)   BMI 26.30 kg/m   Estimated body mass index is 26.3 kg/m  as calculated from the following:    Height as of 18: 1.727 m (5' 8\").    Weight as of this encounter: 78.5 kg (173 lb).  BP completed using cuff size: regular    Questioned patient about current smoking habits.  Pt. quit smoking some time ago.          The following HM Due: NONE      The following patient reported/Care Every where data was sent to:  P ABSTRACT QUALITY INITIATIVES [[id number]]        Bess Rodas [facility]                 "

## 2019-06-26 LAB — COPATH REPORT: NORMAL

## 2019-06-27 LAB
COPATH REPORT: NORMAL
PAP: NORMAL

## 2019-06-28 LAB
FINAL DIAGNOSIS: NORMAL
HPV HR 12 DNA CVX QL NAA+PROBE: NEGATIVE
HPV16 DNA SPEC QL NAA+PROBE: NEGATIVE
HPV18 DNA SPEC QL NAA+PROBE: NEGATIVE
SPECIMEN DESCRIPTION: NORMAL
SPECIMEN SOURCE CVX/VAG CYTO: NORMAL

## 2020-01-03 ENCOUNTER — OFFICE VISIT (OUTPATIENT)
Dept: OBGYN | Facility: CLINIC | Age: 35
End: 2020-01-03
Payer: COMMERCIAL

## 2020-01-03 VITALS — WEIGHT: 178 LBS | DIASTOLIC BLOOD PRESSURE: 82 MMHG | BODY MASS INDEX: 27.06 KG/M2 | SYSTOLIC BLOOD PRESSURE: 132 MMHG

## 2020-01-03 DIAGNOSIS — Z30.44 ENCOUNTER FOR SURVEILLANCE OF VAGINAL RING HORMONAL CONTRACEPTIVE DEVICE: ICD-10-CM

## 2020-01-03 DIAGNOSIS — D06.9 SEVERE CERVICAL DYSPLASIA, HISTOLOGICALLY CONFIRMED: Primary | ICD-10-CM

## 2020-01-03 PROBLEM — Z36.89 ENCOUNTER FOR TRIAGE IN PREGNANT PATIENT: Status: RESOLVED | Noted: 2018-06-04 | Resolved: 2020-01-03

## 2020-01-03 PROBLEM — Z37.9 NORMAL LABOR: Status: RESOLVED | Noted: 2018-06-04 | Resolved: 2020-01-03

## 2020-01-03 PROCEDURE — 88175 CYTOPATH C/V AUTO FLUID REDO: CPT | Performed by: OBSTETRICS & GYNECOLOGY

## 2020-01-03 PROCEDURE — 99213 OFFICE O/P EST LOW 20 MIN: CPT | Mod: 25 | Performed by: OBSTETRICS & GYNECOLOGY

## 2020-01-03 PROCEDURE — G0476 HPV COMBO ASSAY CA SCREEN: HCPCS | Performed by: OBSTETRICS & GYNECOLOGY

## 2020-01-03 PROCEDURE — 88305 TISSUE EXAM BY PATHOLOGIST: CPT | Performed by: OBSTETRICS & GYNECOLOGY

## 2020-01-03 PROCEDURE — 57505 ENDOCERVICAL CURETTAGE: CPT | Performed by: OBSTETRICS & GYNECOLOGY

## 2020-01-03 RX ORDER — ETONOGESTREL AND ETHINYL ESTRADIOL VAGINAL RING .015; .12 MG/D; MG/D
RING VAGINAL
Qty: 3 EACH | Refills: 3 | Status: SHIPPED | OUTPATIENT
Start: 2020-01-03 | End: 2020-11-20

## 2020-01-03 NOTE — PROGRESS NOTES
SUBJECTIVE:                                                   Ernie Jimenes is a 34 year old female who presents to clinic today with the Chief Complaint of:  Patient presents with:  Repeat Pap Smear      Pt also here to f/u MANUEL 3.  Pt had Pap ASC-H w/ Pos HPV 16 (11/10/17), Colpo - MANUEL 2 (10/16/18), then LEEP - MANUEL 2-3 on ectoCx LEEP with positive margins, endoCx LEEP showed MANUEL 1 but endoCx margin neg.  No ECC done at that time.  She then saw me 18 and had Pap-WNL, HPV Neg, and ECC Neg.  She needs 1 more f/u Pap/HPV co-test and ECC per ASCCP guidelines for prior LEEP with HGSIL present with positive margins.    Review of pertinent old records reveals:  The above Hx    No LMP recorded..   Patient is sexually active, .  Using NuvaRing for contraception.    reports that she has quit smoking. Her smoking use included cigarettes. She has a 1.00 pack-year smoking history. She has never used smokeless tobacco.    Problem list and histories reviewed & adjusted, as indicated.  Additional history: as documented.    Patient Active Problem List   Diagnosis     Esophageal reflux     Personal history of tobacco use, presenting hazards to health     Severe cervical dysplasia, histologically confirmed     Anxiety     Past Surgical History:   Procedure Laterality Date     COLPOSCOPY,BX CERVIX/ENDOCERV CURR      basically nl, per Dr. Grant ector [place]     DENTAL SURGERY       HC CREATE EARDRUM OPENING,GEN ANESTH  age 1    P.E. Tubes     LEEP TX, CERVICAL  2018    MANUEL 3     NO        Social History     Tobacco Use     Smoking status: Former Smoker     Packs/day: 0.25     Years: 4.00     Pack years: 1.00     Types: Cigarettes     Smokeless tobacco: Never Used   Substance Use Topics     Alcohol use: No      Problem (# of Occurrences) Relation (Name,Age of Onset)    Breast Cancer (2) Maternal Grandmother, Maternal Aunt    Depression (1) Mother    Hypertension (1) Mother    Lipids (1) Mother     Thyroid Disease (1) Mother            Current Outpatient Medications   Medication Sig     etonogestrel-ethinyl estradiol (NUVARING) 0.12-0.015 MG/24HR vaginal ring Insert 1 ring vaginally every 21 days then remove for 1 week then repeat with new ring.     No current facility-administered medications for this visit.      Allergies   Allergen Reactions     No Known Drug Allergies        ROS:  Review of Systems   Genitourinary: Negative for vaginal discharge.         OBJECTIVE:     /82 (BP Location: Right arm, Patient Position: Sitting, Cuff Size: Adult Regular)   Wt 80.7 kg (178 lb)   BMI 27.06 kg/m    Body mass index is 27.06 kg/m .    Exam:  Physical Exam  Pelvic- Exam chaperoned by nurse, External genitalia normal, Bartholin's glands normal, Micanopy's glands normal, Urethral meatus normal, Urethra normal, Bladder normal, Vagina with normal rugae, no abnormal lesions, no abnormal discharge, Normal cervix without lesions or mucopus, no cervical motion tenderness, Pap smear was Done,  HPV Done ; ECC done as noted below.      Procedure:  Endocervical curettage    After above Pap done, an endocervical curettage was performed using an endocervical curette.  Specimen sent to pathology with Pap.        ASSESSMENT:                                                      Encounter Diagnoses   Name Primary?     Severe cervical dysplasia, histologically confirmed Yes     Encounter for surveillance of vaginal ring hormonal contraceptive device          PLAN:                                                      1)  Pap/HPV, ECC done.    2)  If any abnl findings, needs colpo.    3)  If all neg, can get next Pap/HPV co-test only (no longer needs ECC) at next annual exam around 11/20.  If that is also neg, can resume routine Q 3 year screening.    4)  Rx NuvaRing to give her enough to last to next annual exam in 11/20.      Edmundo Driscoll MD  [facility]

## 2020-01-07 LAB — COPATH REPORT: NORMAL

## 2020-01-08 LAB
COPATH REPORT: NORMAL
PAP: NORMAL

## 2020-01-10 PROBLEM — D06.9 SEVERE CERVICAL DYSPLASIA, HISTOLOGICALLY CONFIRMED: Status: ACTIVE | Noted: 2017-11-10

## 2020-10-01 ENCOUNTER — ALLIED HEALTH/NURSE VISIT (OUTPATIENT)
Dept: NURSING | Facility: CLINIC | Age: 35
End: 2020-10-01
Payer: COMMERCIAL

## 2020-10-01 DIAGNOSIS — Z23 NEED FOR PROPHYLACTIC VACCINATION AND INOCULATION AGAINST INFLUENZA: Primary | ICD-10-CM

## 2020-10-01 PROCEDURE — 90471 IMMUNIZATION ADMIN: CPT

## 2020-10-01 PROCEDURE — 90686 IIV4 VACC NO PRSV 0.5 ML IM: CPT

## 2020-10-01 NOTE — PROGRESS NOTES
Patient presents to influenza program requesting influenza vaccination.  Standing orders implemented.    Vaccination given by Carlota Hanna, [facility]

## 2020-11-04 ENCOUNTER — PATIENT OUTREACH (OUTPATIENT)
Dept: OBGYN | Facility: CLINIC | Age: 35
End: 2020-11-04

## 2020-11-04 DIAGNOSIS — D06.9 SEVERE CERVICAL DYSPLASIA, HISTOLOGICALLY CONFIRMED: ICD-10-CM

## 2020-11-04 NOTE — LETTER
November 4, 2020      Ernie Jimenes  [address] [address]        Dear ,    At [facility], your health and wellness are our primary concern. That is why we are following up on your most recent ECC and Pap smear with HPV testing.    Please call [phone] to schedule an appointment for your recommended follow-up Pap smear and Human Papillomavirus (HPV) test at your earliest convenience.     If you have completed the appointment outside of the [facility] system, please have the records forwarded to our office. We will update your chart for your provider to review before your next annual wellness visit.     Thank you for choosing [facility]!      Sincerely,    Your [facility] Care Team

## 2020-11-20 ENCOUNTER — OFFICE VISIT (OUTPATIENT)
Dept: OBGYN | Facility: CLINIC | Age: 35
End: 2020-11-20
Payer: COMMERCIAL

## 2020-11-20 VITALS — BODY MASS INDEX: 24.63 KG/M2 | DIASTOLIC BLOOD PRESSURE: 78 MMHG | SYSTOLIC BLOOD PRESSURE: 122 MMHG | WEIGHT: 162 LBS

## 2020-11-20 DIAGNOSIS — Z00.00 ROUTINE GENERAL MEDICAL EXAMINATION AT A HEALTH CARE FACILITY: Primary | ICD-10-CM

## 2020-11-20 DIAGNOSIS — Z30.44 ENCOUNTER FOR SURVEILLANCE OF VAGINAL RING HORMONAL CONTRACEPTIVE DEVICE: ICD-10-CM

## 2020-11-20 DIAGNOSIS — D06.9 SEVERE CERVICAL DYSPLASIA, HISTOLOGICALLY CONFIRMED: ICD-10-CM

## 2020-11-20 PROCEDURE — 99395 PREV VISIT EST AGE 18-39: CPT | Performed by: OBSTETRICS & GYNECOLOGY

## 2020-11-20 PROCEDURE — 87624 HPV HI-RISK TYP POOLED RSLT: CPT | Performed by: OBSTETRICS & GYNECOLOGY

## 2020-11-20 RX ORDER — ETONOGESTREL AND ETHINYL ESTRADIOL VAGINAL RING .015; .12 MG/D; MG/D
RING VAGINAL
Qty: 3 EACH | Refills: 3 | Status: SHIPPED | OUTPATIENT
Start: 2020-11-20 | End: 2022-07-21

## 2020-11-20 NOTE — PROGRESS NOTES
SUBJECTIVE:   CC: Ernie Jimenes is an 34 year old woman who presents for preventive health visit.       Patient has been advised of split billing requirements and indicates understanding: Yes  Healthy Habits:    Do you get at least three servings of calcium containing foods daily (dairy, green leafy vegetables, etc.)? yes    Amount of exercise or daily activities, outside of work: 3 day(s) per week    Problems taking medications regularly No    Medication side effects: No    Have you had an eye exam in the past two years? no    Do you see a dentist twice per year? yes    Do you have sleep apnea, excessive snoring or daytime drowsiness?no      Wants to Rx NuvaRing.    Pt also here to f/u MANUEL 3.  Pap Hx:    11/10/17 Pap-ASC-H, Pos HPV 16  10/16/2018 Colpo- MANUEL 2  11/14/2018 LEEP- MANUEL 1 w/ EndoCx margin Neg, no ECC done  6/24/2019 Pap WNL, HPV Neg, ECC Neg  1/3/2020 Pap WNL HPV Neg, ECC Neg.    Therefore due for Pap/HPV co-test today.    Today's PHQ-2 Score:   PHQ-2 ( 1999 Pfizer) 6/30/2015   Q1: Little interest or pleasure in doing things 0   Q2: Feeling down, depressed or hopeless 0   PHQ-2 Score 0       Abuse: Current or Past(Physical, Sexual or Emotional)- No  Do you feel safe in your environment? Yes        Social History     Tobacco Use     Smoking status: Former Smoker     Packs/day: 0.25     Years: 4.00     Pack years: 1.00     Types: Cigarettes     Smokeless tobacco: Never Used   Substance Use Topics     Alcohol use: No     If you drink alcohol do you typically have >3 drinks per day or >7 drinks per week? No                     Reviewed orders with patient.  Reviewed health maintenance and updated orders accordingly - Yes  Current Outpatient Medications   Medication Sig Dispense Refill     etonogestrel-ethinyl estradiol (NUVARING) 0.12-0.015 MG/24HR vaginal ring Insert 1 ring vaginally every 21 days then remove for 1 week then repeat with new ring. 3 each 3       Mammogram not appropriate for this  patient based on age.    Pertinent mammograms are reviewed under the imaging tab.  History of abnormal Pap smear: YES - updated in Problem List and Health Maintenance accordingly  PAP / HPV Latest Ref Rng & Units 1/3/2020 2019 11/10/2017   PAP - NIL NIL ASC-H(A)   HPV 16 DNA NEG:Negative Negative Negative Positive(A)   HPV 18 DNA NEG:Negative Negative Negative Negative   OTHER HR HPV NEG:Negative Negative Negative Negative     Reviewed and updated as needed this visit by clinical staff  Tobacco  Allergies  Meds   Med Hx  Surg Hx  Fam Hx  Soc Hx        Reviewed and updated as needed this visit by Provider                Past Medical History:   Diagnosis Date     +++ OUTSIDE RECORDS SCAN  +++      Anxiety 2015     ASCUS on Pap smear     hpv 16 high risk, s/p colposcopy ob/gyn [place] basically nl     Esophageal reflux      History of colposcopy with cervical biopsy 16     neg     LSIL (low grade squamous intraepithelial lesion) on Pap smear 05/14/10     Personal history of tobacco use, presenting hazards to health       Past Surgical History:   Procedure Laterality Date     COLPOSCOPY,BX CERVIX/ENDOCERV CURR      basically nl, per Dr. Grant [facility]     DENTAL SURGERY       HC CREATE EARDRUM OPENING,GEN ANESTH  age 1    P.E. Tubes     LEEP TX, CERVICAL  2018    MANUEL 3     NO       OB History    Para Term  AB Living   3 2 2 0 1 2   SAB TAB Ectopic Multiple Live Births   0 1 0 0 2      # Outcome Date GA Lbr Shyam/2nd Weight Sex Delivery Anes PTL Lv   3 Term 18 39w5d 04:11 / 00:13 3.16 kg (6 lb 15.5 oz) M Vag-Spont EPI N AMINAH      Name: MELISSA CARRILLO1 INO      Apgar1: 9  Apgar5: 9   2 Term 05/12/15 40w3d 07:21 / 04:06 3.039 kg (6 lb 11.2 oz) M Vag-Spont EPI N AMINAH      Name: Carlos      Apgar1: 9  Apgar5: 9   1 TAB  8w0d              ROS:  CONSTITUTIONAL: NEGATIVE for fever, chills, change in weight  INTEGUMENTARU/SKIN: NEGATIVE for worrisome rashes, moles  or lesions  EYES: NEGATIVE for vision changes or irritation  ENT: NEGATIVE for ear, mouth and throat problems  RESP: NEGATIVE for significant cough or SOB  BREAST: NEGATIVE for masses, tenderness or discharge  CV: NEGATIVE for chest pain, palpitations or peripheral edema  GI: NEGATIVE for nausea, abdominal pain, heartburn, or change in bowel habits  : NEGATIVE for unusual urinary or vaginal symptoms. Periods are regular.  MUSCULOSKELETAL: NEGATIVE for significant arthralgias or myalgia  NEURO: NEGATIVE for weakness, dizziness or paresthesias  PSYCHIATRIC: NEGATIVE for changes in mood or affect    OBJECTIVE:   /78 (BP Location: Right arm, Patient Position: Sitting, Cuff Size: Adult Regular)   Wt 73.5 kg (162 lb)   LMP 11/09/2020 (Within Days)   BMI 24.63 kg/m    EXAM:  GENERAL: healthy, alert and no distress  EYES: Eyes grossly normal to inspection, PERRL and conjunctivae and sclerae normal  HENT: ear canals and TM's normal, nose and mouth without ulcers or lesions  NECK: no adenopathy, no asymmetry, masses, or scars and thyroid normal to palpation  RESP: lungs clear to auscultation - no rales, rhonchi or wheezes  BREAST: normal without masses, tenderness or nipple discharge and no palpable axillary masses or adenopathy  CV: regular rate and rhythm, normal S1 S2, no S3 or S4, no murmur, click or rub, no peripheral edema and peripheral pulses strong  ABDOMEN: soft, nontender, no hepatosplenomegaly, no masses and bowel sounds normal   (female): normal female external genitalia, normal urethral meatus, vaginal mucosa pink, moist, well rugated, and normal cervix/adnexa/uterus without masses or discharge  MS: no gross musculoskeletal defects noted, no edema  SKIN: no suspicious lesions or rashes  NEURO: Normal strength and tone, mentation intact and speech normal  PSYCH: mentation appears normal, affect normal/bright    Diagnostic Test Results:  none     ASSESSMENT/PLAN:       ICD-10-CM    1. Routine general  "medical examination at a health care facility  Z00.00    2. Severe cervical dysplasia, histologically confirmed  D06.9    3. Encounter for surveillance of vaginal ring hormonal contraceptive device  Z30.44 etonogestrel-ethinyl estradiol (NUVARING) 0.12-0.015 MG/24HR vaginal ring     Pap/HPV done.  If all neg, resume Q 3 year screening.    Rx NuvaRing.    Patient has been advised of split billing requirements and indicates understanding: Yes  COUNSELING:   Reviewed preventive health counseling, as reflected in patient instructions       Contraception    Estimated body mass index is 24.63 kg/m  as calculated from the following:    Height as of 7/18/18: 1.727 m (5' 8\").    Weight as of this encounter: 73.5 kg (162 lb).        She reports that she has quit smoking. Her smoking use included cigarettes. She has a 1.00 pack-year smoking history. She has never used smokeless tobacco.      Counseling Resources:  ATP IV Guidelines  Pooled Cohorts Equation Calculator  Breast Cancer Risk Calculator  BRCA-Related Cancer Risk Assessment: FHS-7 Tool  FRAX Risk Assessment  ICSI Preventive Guidelines  Dietary Guidelines for Americans, 2010  [employer]'s MyPlate  ASA Prophylaxis  Lung CA Screening    Edmundo Driscoll MD  [facility] [place]  "

## 2020-11-20 NOTE — NURSING NOTE
"Chief Complaint   Patient presents with     Repeat Pap Smear       Initial /78 (BP Location: Right arm, Patient Position: Sitting, Cuff Size: Adult Regular)   Wt 73.5 kg (162 lb)   LMP 2020 (Within Days)   BMI 24.63 kg/m   Estimated body mass index is 24.63 kg/m  as calculated from the following:    Height as of 18: 1.727 m (5' 8\").    Weight as of this encounter: 73.5 kg (162 lb).  BP completed using cuff size: regular    Questioned patient about current smoking habits.  Pt. has never smoked.          The following HM Due: NONE    Dago Kelsey CMA                "

## 2020-11-20 NOTE — LETTER
December 2, 2020      Ernie Jimenes  [address] [address] [address]        Dear ,    We are happy to inform you that your recent Pap smear and Human Papillomavirus (HPV) test results are normal and negative.    It is recommended that you have your next Pap smear and Human Papillomavirus (HPV) test in 3 years. You will also need to return to the clinic every year for an annual wellness visit.    If you have additional questions regarding this result, please contact our office and we will be happy to assist you.      Sincerely,    Your [facility] Care Team

## 2020-11-24 LAB
COPATH REPORT: NORMAL
PAP: NORMAL

## 2021-09-19 ENCOUNTER — HEALTH MAINTENANCE LETTER (OUTPATIENT)
Age: 36
End: 2021-09-19

## 2021-10-25 ENCOUNTER — IMMUNIZATION (OUTPATIENT)
Dept: INTERNAL MEDICINE | Facility: CLINIC | Age: 36
End: 2021-10-25
Payer: COMMERCIAL

## 2021-10-25 PROCEDURE — 90471 IMMUNIZATION ADMIN: CPT

## 2021-10-25 PROCEDURE — 90686 IIV4 VACC NO PRSV 0.5 ML IM: CPT

## 2022-01-09 ENCOUNTER — HEALTH MAINTENANCE LETTER (OUTPATIENT)
Age: 37
End: 2022-01-09

## 2022-07-21 ENCOUNTER — PRENATAL OFFICE VISIT (OUTPATIENT)
Dept: NURSING | Facility: CLINIC | Age: 37
End: 2022-07-21
Payer: COMMERCIAL

## 2022-07-21 DIAGNOSIS — O09.529 SUPERVISION OF HIGH-RISK PREGNANCY OF ELDERLY MULTIGRAVIDA: Primary | ICD-10-CM

## 2022-07-21 PROCEDURE — 99207 PR NO CHARGE NURSE ONLY: CPT

## 2022-07-21 RX ORDER — PNV NO.95/FERROUS FUM/FOLIC AC 28MG-0.8MG
TABLET ORAL
COMMUNITY
End: 2024-02-27

## 2022-07-21 RX ORDER — PYRIDOXINE HCL (VITAMIN B6) 25 MG
25 TABLET ORAL DAILY
COMMUNITY
End: 2022-10-07

## 2022-07-21 NOTE — NURSING NOTE
NPN nurse visit done over the phone. Pt will be given NPN folder and book at her upcoming appt.   Discussed optional screening available to assess chromosomal anomalies. Questions answered. Pt advised to call the clinic if she has any questions or concerns related to her pregnancy. Prenatal labs will be obtained at her upcoming appt. New prenatal visit scheduled on 7/25 with .    9w5d    Lab Results   Component Value Date    PAP NIL 11/20/2020           Patient supplied answers from flow sheet for:  Prenatal OB Questionnaire.  Past Medical History  Have you ever recieved care for your mental health? : (P) No  Have you ever been in a major accident or suffered serious trauma?: (P) No  Within the last year, has anyone hit, slapped, kicked or otherwise hurt you?: (P) No  In the last year, has anyone forced you to have sex when you didn't want to?: (P) No    Past Medical History 2   Have you ever received a blood transfusion?: (P) No  Would you accept a blood transfusion if was medically recommended?: (P) Yes  Does anyone in your home smoke?: (P) No   Is your blood type Rh negative?: (P) No  Have you ever ?: (P) No  Have you been hospitalized for a nonsurgical reason excluding normal delivery?: (P) No  Have you ever had an abnormal pap smear?: (!) (P) Yes    Past Medical History (Continued)  Do you have a history of abnormalities of the uterus?: No  Did your mother take JASON or any other hormones when she was pregnant with you?: (P) No  Do you have any other problems we have not asked about which you feel may be important to this pregnancy?: (P) No       Nata KLEIN RN

## 2022-07-22 ENCOUNTER — ANCILLARY PROCEDURE (OUTPATIENT)
Dept: ULTRASOUND IMAGING | Facility: CLINIC | Age: 37
End: 2022-07-22
Payer: COMMERCIAL

## 2022-07-22 ENCOUNTER — LAB (OUTPATIENT)
Dept: LAB | Facility: CLINIC | Age: 37
End: 2022-07-22

## 2022-07-22 DIAGNOSIS — O09.529 SUPERVISION OF HIGH-RISK PREGNANCY OF ELDERLY MULTIGRAVIDA: ICD-10-CM

## 2022-07-22 LAB
ABO/RH(D): NORMAL
ANTIBODY SCREEN: NEGATIVE
ERYTHROCYTE [DISTWIDTH] IN BLOOD BY AUTOMATED COUNT: 12.6 % (ref 10–15)
HCT VFR BLD AUTO: 41.1 % (ref 35–47)
HGB BLD-MCNC: 14.2 G/DL (ref 11.7–15.7)
MCH RBC QN AUTO: 31.8 PG (ref 26.5–33)
MCHC RBC AUTO-ENTMCNC: 34.5 G/DL (ref 31.5–36.5)
MCV RBC AUTO: 92 FL (ref 78–100)
PLATELET # BLD AUTO: 255 10E3/UL (ref 150–450)
RBC # BLD AUTO: 4.47 10E6/UL (ref 3.8–5.2)
SPECIMEN EXPIRATION DATE: NORMAL
T PALLIDUM AB SER QL: NONREACTIVE
WBC # BLD AUTO: 8.1 10E3/UL (ref 4–11)

## 2022-07-22 PROCEDURE — 86900 BLOOD TYPING SEROLOGIC ABO: CPT

## 2022-07-22 PROCEDURE — 86901 BLOOD TYPING SEROLOGIC RH(D): CPT

## 2022-07-22 PROCEDURE — 87389 HIV-1 AG W/HIV-1&-2 AB AG IA: CPT

## 2022-07-22 PROCEDURE — 85027 COMPLETE CBC AUTOMATED: CPT

## 2022-07-22 PROCEDURE — 86780 TREPONEMA PALLIDUM: CPT

## 2022-07-22 PROCEDURE — 87086 URINE CULTURE/COLONY COUNT: CPT

## 2022-07-22 PROCEDURE — 87340 HEPATITIS B SURFACE AG IA: CPT

## 2022-07-22 PROCEDURE — 86803 HEPATITIS C AB TEST: CPT

## 2022-07-22 PROCEDURE — 86850 RBC ANTIBODY SCREEN: CPT

## 2022-07-22 PROCEDURE — 86762 RUBELLA ANTIBODY: CPT

## 2022-07-22 PROCEDURE — 36415 COLL VENOUS BLD VENIPUNCTURE: CPT

## 2022-07-22 PROCEDURE — 76801 OB US < 14 WKS SINGLE FETUS: CPT | Performed by: OBSTETRICS & GYNECOLOGY

## 2022-07-23 LAB — BACTERIA UR CULT: NORMAL

## 2022-07-25 ENCOUNTER — PRENATAL OFFICE VISIT (OUTPATIENT)
Dept: OBGYN | Facility: CLINIC | Age: 37
End: 2022-07-25
Payer: COMMERCIAL

## 2022-07-25 ENCOUNTER — TRANSCRIBE ORDERS (OUTPATIENT)
Dept: MATERNAL FETAL MEDICINE | Facility: CLINIC | Age: 37
End: 2022-07-25

## 2022-07-25 VITALS — DIASTOLIC BLOOD PRESSURE: 64 MMHG | SYSTOLIC BLOOD PRESSURE: 138 MMHG | WEIGHT: 154 LBS | BODY MASS INDEX: 23.42 KG/M2

## 2022-07-25 DIAGNOSIS — O09.521 MULTIGRAVIDA OF ADVANCED MATERNAL AGE IN FIRST TRIMESTER: ICD-10-CM

## 2022-07-25 DIAGNOSIS — O34.41 HISTORY OF LOOP ELECTROSURGICAL EXCISION PROCEDURE (LEEP) OF CERVIX AFFECTING PREGNANCY IN FIRST TRIMESTER: Primary | ICD-10-CM

## 2022-07-25 DIAGNOSIS — Z11.3 SCREEN FOR STD (SEXUALLY TRANSMITTED DISEASE): ICD-10-CM

## 2022-07-25 DIAGNOSIS — O26.90 PREGNANCY RELATED CONDITION, ANTEPARTUM: Primary | ICD-10-CM

## 2022-07-25 DIAGNOSIS — Z98.890 HISTORY OF LOOP ELECTROSURGICAL EXCISION PROCEDURE (LEEP) OF CERVIX AFFECTING PREGNANCY IN FIRST TRIMESTER: Primary | ICD-10-CM

## 2022-07-25 PROBLEM — O09.529 AMA (ADVANCED MATERNAL AGE) MULTIGRAVIDA 35+: Status: ACTIVE | Noted: 2022-07-25

## 2022-07-25 PROBLEM — O34.40 H/O LEEP (LOOP ELECTROSURGICAL EXCISION PROCEDURE) OF CERVIX COMPLICATING PREGNANCY: Status: ACTIVE | Noted: 2022-07-25

## 2022-07-25 LAB
HBV SURFACE AG SERPL QL IA: NONREACTIVE
HCV AB SERPL QL IA: NONREACTIVE
HIV 1+2 AB+HIV1 P24 AG SERPL QL IA: NONREACTIVE
RUBV IGG SERPL QL IA: 1.14 INDEX
RUBV IGG SERPL QL IA: POSITIVE

## 2022-07-25 PROCEDURE — 99207 PR FIRST OB VISIT: CPT | Performed by: OBSTETRICS & GYNECOLOGY

## 2022-07-25 PROCEDURE — 87591 N.GONORRHOEAE DNA AMP PROB: CPT | Performed by: OBSTETRICS & GYNECOLOGY

## 2022-07-25 PROCEDURE — 87491 CHLMYD TRACH DNA AMP PROBE: CPT | Performed by: OBSTETRICS & GYNECOLOGY

## 2022-07-25 PROCEDURE — 36415 COLL VENOUS BLD VENIPUNCTURE: CPT | Performed by: OBSTETRICS & GYNECOLOGY

## 2022-07-25 NOTE — NURSING NOTE
"Chief Complaint   Patient presents with     Prenatal Care     10 weeks- concerns of tinnitus        Initial /64 (BP Location: Right arm, Patient Position: Sitting, Cuff Size: Adult Regular)   Wt 69.9 kg (154 lb)   LMP 2022 (Exact Date)   BMI 23.42 kg/m   Estimated body mass index is 23.42 kg/m  as calculated from the following:    Height as of 18: 1.727 m (5' 8\").    Weight as of this encounter: 69.9 kg (154 lb).  BP completed using cuff size: regular    Questioned patient about current smoking habits.  Pt. has never smoked.          The following HM Due: NONE    10w2d  Dago Kelsey CMA                "

## 2022-07-25 NOTE — PROGRESS NOTES
This is a 36 year old female patient,   who presents for her first obstetrical visit. This pregnancy is Unplanned, Desired.    EDC 2023 by LMP which makes her 10w2d  today.  Her cycles are regular.  Her last menstrual period was normal. Since her LMP, she has experienced  nausea and emesis.  She denies vaginal discharge, pelvic pain and vaginal bleeding. Ultrasound in the 1st trimester showed EDC consistent with dates by LMP.     OB History    Para Term  AB Living   4 2 2 0 1 2   SAB IAB Ectopic Multiple Live Births   0 1 0 0 2      # Outcome Date GA Lbr Shyam/2nd Weight Sex Delivery Anes PTL Lv   4 Current            3 Term 18 39w5d 04:11 / 00:13 3.16 kg (6 lb 15.5 oz) M Vag-Spont EPI N AMINAH      Name: DIDI CARRILLO INO      Apgar1: 9  Apgar5: 9   2 Term 05/12/15 40w3d 07:21 / 04:06 3.039 kg (6 lb 11.2 oz) M Vag-Spont EPI N AMINAH      Name: Ionia      Apgar1: 9  Apgar5: 9   1 IAB  8w0d              History of GDM: No,  PTL : No,  History of HTN in pregnancy: No,  Thrombocytopenia: No,  Shoulder dystocia: No,  Vacuum Extraction: No  PPH: No   3rd of 4th degree laceration: No.   Other complications: No      Since her last LMP she denies use of alcohol, tobacco and street drugs.    HISTORY:  Past Medical History:   Diagnosis Date     +++ OUTSIDE RECORDS SCAN  +++      Abnormal Pap smear of cervix      Anxiety 2015     ASCUS on Pap smear 2008    hpv 16 high risk, s/p colposcopy ob/gyn [place] basically nl     Esophageal reflux      History of colposcopy with cervical biopsy 2016    neg     History of human papillomavirus infection      LSIL (low grade squamous intraepithelial lesion) on Pap smear 2010     Personal history of tobacco use, presenting hazards to health      Urinary tract infection      Varicella      Past Surgical History:   Procedure Laterality Date     COLPOSCOPY,BX CERVIX/ENDOCERV CURR      basically nl, per Dr. Juanita osuna      DENTAL SURGERY  2003     LEEP TX, CERVICAL  11/14/2018    MANUEL 3     NO       ZZHC CREATE EARDRUM OPENING,GEN ANESTH  age 1    P.E. Tubes     Family History   Problem Relation Age of Onset     Lipids Mother      Depression Mother      Hypertension Mother      Thyroid Disease Mother      No Known Problems Father      No Known Problems Sister      No Known Problems Sister      Breast Cancer Maternal Grandmother      Breast Cancer Maternal Aunt      Social History     Socioeconomic History     Marital status:      Spouse name: Dante     Number of children: 2   Occupational History     Employer: [employer]UD[employer]   Tobacco Use     Smoking status: Former Smoker     Packs/day: 0.25     Years: 4.00     Pack years: 1.00     Types: Cigarettes     Smokeless tobacco: Never Used   Substance and Sexual Activity     Alcohol use: No     Drug use: No     Sexual activity: Yes     Partners: Male   Other Topics Concern      Service No     Blood Transfusions No     Caffeine Concern No     Occupational Exposure Yes     Hobby Hazards No     Sleep Concern No     Stress Concern No     Weight Concern No     Special Diet No     Back Care No     Exercise Yes     Bike Helmet Yes     Seat Belt Yes     Self-Exams No   Social History Narrative    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 Lives with sisterkasia Weinberg, mother kelin loyola     Current Outpatient Medications   Medication Sig     doxylamine (UNISOM) 25 MG TABS tablet Take 25 mg by mouth At Bedtime     Prenatal Vit-Fe Fumarate-FA (PRENATAL VITAMIN) 27-0.8 MG TABS      pyridOXINE (VITAMIN B6) 25 MG tablet Take 25 mg by mouth daily     No current facility-administered medications for this visit.     Allergies   Allergen Reactions     No Known Drug Allergies        Past medical, surgical, social and family history were reviewed and updated in EPIC.    ROS:   12 point review of systems negative other than symptoms noted below.    EXAM:  LMP 05/14/2022 (Exact Date)    BMI: There is  no height or weight on file to calculate BMI.    EXAM:  Constitutional: Appearance: Well nourished, well developed alert, in no acute distress  Chest:  Respiratory Effort:  Breathing unlabored  Cardiovascular:Heart    Auscultation:  Regular rate, normal rhythm, no murmurs present  Gastrointestinal:  Abdominal Examination:  Abdomen nontender to palpation, tone normal without     rigidity or guarding, no masses present, umbilicus without lesions    Liver and speen:  No hepatomegaly present, liver nontender to palpation    Hernias:  No hernias present    FHTs auscultated at 162.  Skin:  General Inspection:  No rashes present, no lesions present, no areas of  discoloration.  Neurologic/Psychiatric:    Mental Status:  Oriented X3       Pelvis: External genitalia, Bartholin, urethral, and Crouch glands within normal limits. Urethra is without lesion and nontender to palpation. Bladder is nontender. On speculum exam, cervix is without lesion and vagina is normal without lesion or discharge. Pap smear not due, GC/Chlam done.    ASSESSMENT:      ICD-10-CM    1. History of loop electrosurgical excision procedure (LEEP) of cervix affecting pregnancy in first trimester  O34.41     Z98.890    2. Multigravida of advanced maternal age in first trimester  O09.521 Mat Fetal Med Ctr Referral - Pregnancy     Non Invasive Prenatal Test Cell Free DNA   3. Screen for STD (sexually transmitted disease)  Z11.3 NEISSERIA GONORRHOEA PCR     CHLAMYDIA TRACHOMATIS PCR       PLAN:    Prenatal labs reviewed. She has no questions.    GC/Chlam done.    Discussed options for screening for and diagnosis of chromosomal anomalies, including first trimester screen, noninvasive prenatal testing/cell-free fetal DNA testing, CVS/amniocentesis, quad screen, and ultrasound or comprehensive Level II US at 18-20 weeks. She is electing first trimester screen and noninvasive prenatal testing, plus have MFM assess Cx length at [facility]   Level 2 U/S at 20 weeks.    Reviewed early pregnancy education, diet, exercise, prenatal vitamins, intercourse. Reviewed the call schedule, labor and delivery, and the schedule of prenatal visits.    RTC 6 weeks. She is encouraged to call sooner with questions or concerns.      Edmundo Driscoll MD  [facility]

## 2022-07-26 LAB
C TRACH DNA SPEC QL NAA+PROBE: NEGATIVE
N GONORRHOEA DNA SPEC QL NAA+PROBE: NEGATIVE

## 2022-08-01 LAB — SCANNED LAB RESULT: NORMAL

## 2022-08-03 ENCOUNTER — PRE VISIT (OUTPATIENT)
Dept: MATERNAL FETAL MEDICINE | Facility: CLINIC | Age: 37
End: 2022-08-03

## 2022-08-10 ENCOUNTER — OFFICE VISIT (OUTPATIENT)
Dept: MATERNAL FETAL MEDICINE | Facility: CLINIC | Age: 37
End: 2022-08-10
Attending: OBSTETRICS & GYNECOLOGY
Payer: COMMERCIAL

## 2022-08-10 ENCOUNTER — HOSPITAL ENCOUNTER (OUTPATIENT)
Dept: ULTRASOUND IMAGING | Facility: CLINIC | Age: 37
Discharge: HOME | End: 2022-08-10
Attending: OBSTETRICS & GYNECOLOGY
Payer: COMMERCIAL

## 2022-08-10 DIAGNOSIS — O26.90 PREGNANCY RELATED CONDITION, ANTEPARTUM: ICD-10-CM

## 2022-08-10 DIAGNOSIS — O09.521 MULTIGRAVIDA OF ADVANCED MATERNAL AGE IN FIRST TRIMESTER: Primary | ICD-10-CM

## 2022-08-10 PROCEDURE — 76801 OB US < 14 WKS SINGLE FETUS: CPT | Mod: 26 | Performed by: OBSTETRICS & GYNECOLOGY

## 2022-08-10 PROCEDURE — 96040 HC GENETIC COUNSELING, EACH 30 MINUTES: CPT | Performed by: GENETIC COUNSELOR, MS

## 2022-08-10 PROCEDURE — 76801 OB US < 14 WKS SINGLE FETUS: CPT

## 2022-08-10 NOTE — PROGRESS NOTES
Please refer to ultrasound report under 'Imaging' Studies of 'Chart Review' tabs.    Ted Lopez M.D.

## 2022-08-10 NOTE — PROGRESS NOTES
[facility] [place]  Genetic Counseling Consult    Patient: Ernie Jimenes YOB: 1985   Date of Service: 8/10/22      Ernie Jimenes was seen at [facility] for genetic consultation to discuss the options for screening and testing for fetal chromosome abnormalities.  The indication for genetic counseling is advanced maternal age. The patient was unaccompanied to today's visit.        Impression/Plan:   1.  Ernie underwent [facility] NIPT earlier in this pregnancy which was low risk. She is aware that diagnostic testing will remain available to her.     2.  Maternal serum AFP (single marker screen) is recommended after 15 weeks to screen for open neural tube defects. A quad screen should not be performed.    3.  An 18-20 week comprehensive ultrasound is standard of care for all women 35 or older at delivery.    4. Ernie's partner, Dante was reported to have congenital adrenal hyperplasia. We reviewed possible risk to children and options for further evaluation including carrier screening. Ernie plans to discuss further with Dante and will reach out with any questions or if they wish to pursue any further testing in the pregnancy.    Pregnancy History:   /Parity:    Age at Delivery: 37 year old  RILEY: 2023, by Last Menstrual Period  Gestational Age: 12w4d    No significant complications or exposures were reported in the current pregnancy.    Ernie felix pregnancy history is significant for two term vaginal deliveries.    Medical History:   Ernie felix reported medical history is not expected to impact pregnancy management or risks to fetal development.       Family History:   A three-generation pedigree was obtained, and is scanned under the  Media  tab. Additionally, please see initial genetic counseling family history information in previous pregnancy on 2017.   The following significant findings were reported by  "Ernie:    Ernie's partner, Dante was reported to have congenital adrenal hyperplasia managed with steroids. Congenital adrenal hyperplasia (CAH) is a group of inherited autosomal recessive disorders characterized by a deficiency of one of the enzymes needed to make specific hormones. Ernie is not certain if Dante has undergone genetic testing for CAH. No additional family history of CAH was reported. We reviewed that there are different types of CAH (most common being 21-hydroxylase deficiency due to genetic variants in [id number]). We discussed that we would expect Dante to pass on a variant to all children (all of the couple's children are expected to be at minimum carriers) and if Ernie is also a carrier there would be a 50% chance to have an affected child. Carrier screening is available if the couple is interested, however we would want to confirm specific type/enzyme deficiency in Dante to ensure we are doing carrier screening in Ernie for the correct gene. Ernie plans to discuss further with Dante and will reach out with any questions or if they wish to pursue any further testing in the pregnancy. She is aware that CA[place] is included on [place]  screen and was also encouraged to share this family history information with their pediatrician. She was provided with written information regarding CAH.     The couple's eldest son, Carlos was reported to have a heart murmur identified as an infant. Ernie provided verbal permission for our team to enter his chart for review. Upon review of his chart, records state \"innocent vibratory flow murmur\". No additional prenatal cardiac screening for the pregnancy ist typically recommended. She was encouraged to share this information with their pediatrician.   Ernie's maternal aunt was reported to have breast cancer in her 50's. Ernie's maternal grandmother was reported to have breast cancer in her 80's. Dante's father reportedly passed away in his 50's due to brain cancer. " Dante's paternal aunt was reportedly recently diagnosed with what Ernie believes is the same type of brain cancer in her 60's. It was reported that Dante's maternal grandfather may have passed away from brain cancer. We discussed how most cancer seen in families occurs sporadically, but about 5-10% may be due to an underlying genetic etiology. The couple was encouraged to share this family history information with their primary care providers to ensure appropriate screening.    Otherwise, the reported family history is negative for multiple miscarriages, stillbirths, birth defects, intellectual disability, known genetic conditions, and consanguinity.       Carrier Screening:   The patient reports that she and the father of the pregnancy have  ancestry:     Cystic fibrosis is an autosomal recessive genetic condition that occurs with increased frequency in individuals of  ancestry and carrier screening for this condition is available.  In addition,  screening in the [facility] includes cystic fibrosis.      Expanded carrier screening for mutations in a large panel of genes associated with autosomal recessive conditions including cystic fibrosis, spinal muscular atrophy, and others, is now available.      The patient has declined the carrier screening options reviewed today.       Risk Assessment for Chromosome Conditions:   We explained that the risk for fetal chromosome abnormalities increases with maternal age. We discussed specific features of common chromosome abnormalities, including Down syndrome, trisomy 13, trisomy 18, and sex chromosome trisomies.      - At age 37 at midtrimester, the risk to have a baby with Down syndrome is 1 in 129.     - At age 37 at midtrimester, the risk to have a baby with any chromosome abnormality is 1 in 65.       Ernie underwent [facility] NIPT earlier in this pregnancy with her primary OB. Results indicate no aneuploidy detected for chromosomes 21, 18,  13, or sex chromosomes. This puts her current pregnancy at low risk for Down syndrome, trisomy 18, trisomy 13 and sex chromosome abnormalities. This test is reported to have the following sensitivities: Down syndrome: 99.99%, trisomy 18: 99.99%, and trisomy 13: 99.99%. Although these results are reassuring, this does not replace a standard chromosome analysis from a chorionic villus sampling or amniocentesis.          Testing Options:   We discussed the following options:   Chorionic villus sampling (CVS)    Invasive procedure typically performed in the first trimester by which placental villi are obtained for the purpose of chromosome analysis and/or other prenatal genetic analysis    Diagnostic results; >99% sensitivity for fetal chromosome abnormalities    Cannot test for open neural tube defects; maternal serum AFP after 15 weeks is recommended     Genetic Amniocentesis    Invasive procedure typically performed in the second trimester by which amniotic fluid is obtained for the purpose of chromosome analysis and/or other prenatal genetic analysis    Diagnostic results; >99% sensitivity for fetal chromosome abnormalities    AFAFP measurement tests for open neural tube defects     Comprehensive (Level II) ultrasound: Detailed ultrasound performed between 18-22 weeks gestation to screen for major birth defects and markers for aneuploidy.      We reviewed the benefits and limitations of this testing.  Screening tests provide a risk assessment specific to the pregnancy for certain fetal chromosome abnormalities, but cannot definitively diagnose or exclude a fetal chromosome abnormality.  Follow-up genetic counseling and consideration of diagnostic testing is recommended with any abnormal screening result.     Diagnostic tests carry inherent risks- including risk of miscarriage- that require careful consideration.  These tests can detect fetal chromosome abnormalities with greater than 99% certainty.  Results can be  compromised by maternal cell contamination or mosaicism, and are limited by the resolution of cytogenetic G-banding technology.  There is no screening nor diagnostic test that can detect all forms of birth defects or mental disability.     It was a pleasure to be involved with Ernie s care. Face-to-face time of the meeting was 35 minutes.    Apoorva Razo MS, [facility]  Licensed Genetic Counselor  [facility]  Maternal Fetal Medicine  Ph: [phone]  [email or web address]

## 2022-09-09 ENCOUNTER — PRENATAL OFFICE VISIT (OUTPATIENT)
Dept: OBGYN | Facility: CLINIC | Age: 37
End: 2022-09-09
Payer: COMMERCIAL

## 2022-09-09 VITALS — WEIGHT: 165 LBS | DIASTOLIC BLOOD PRESSURE: 62 MMHG | SYSTOLIC BLOOD PRESSURE: 122 MMHG | BODY MASS INDEX: 25.09 KG/M2

## 2022-09-09 DIAGNOSIS — Z98.890 HISTORY OF LOOP ELECTROSURGICAL EXCISION PROCEDURE (LEEP) OF CERVIX AFFECTING PREGNANCY IN SECOND TRIMESTER: ICD-10-CM

## 2022-09-09 DIAGNOSIS — O34.42 HISTORY OF LOOP ELECTROSURGICAL EXCISION PROCEDURE (LEEP) OF CERVIX AFFECTING PREGNANCY IN SECOND TRIMESTER: ICD-10-CM

## 2022-09-09 DIAGNOSIS — O09.522 MULTIGRAVIDA OF ADVANCED MATERNAL AGE IN SECOND TRIMESTER: Primary | ICD-10-CM

## 2022-09-09 PROCEDURE — 36415 COLL VENOUS BLD VENIPUNCTURE: CPT | Performed by: OBSTETRICS & GYNECOLOGY

## 2022-09-09 PROCEDURE — 99000 SPECIMEN HANDLING OFFICE-LAB: CPT | Performed by: OBSTETRICS & GYNECOLOGY

## 2022-09-09 PROCEDURE — 82105 ALPHA-FETOPROTEIN SERUM: CPT | Mod: 90 | Performed by: OBSTETRICS & GYNECOLOGY

## 2022-09-09 PROCEDURE — 99207 PR COMPLICATED OB VISIT: CPT | Performed by: OBSTETRICS & GYNECOLOGY

## 2022-09-09 NOTE — NURSING NOTE
"Chief Complaint   Patient presents with     Prenatal Care     16 weeks 6 days- no concerns        Initial /62 (BP Location: Right arm, Patient Position: Sitting, Cuff Size: Adult Regular)   Wt 74.8 kg (165 lb)   LMP 2022 (Exact Date)   BMI 25.09 kg/m   Estimated body mass index is 25.09 kg/m  as calculated from the following:    Height as of 18: 1.727 m (5' 8\").    Weight as of this encounter: 74.8 kg (165 lb).  BP completed using cuff size: regular    Questioned patient about current smoking habits.  Pt. has never smoked.          The following HM Due: NONE    16w6d  Dago Kelsey CMA              "

## 2022-09-11 LAB
# FETUSES US: NORMAL
AFP MOM SERPL: 0.91
AFP SERPL-MCNC: 33 NG/ML
AGE - REPORTED: 38.2 YR
CURRENT SMOKER: NO
FAMILY MEMBER DISEASES HX: NO
GA METHOD: NORMAL
GA: NORMAL WK
IDDM PATIENT QL: NO
INTEGRATED SCN PATIENT-IMP: NORMAL
SPECIMEN DRAWN SERPL: NORMAL

## 2022-09-21 ENCOUNTER — HOSPITAL ENCOUNTER (OUTPATIENT)
Dept: ULTRASOUND IMAGING | Facility: CLINIC | Age: 37
Discharge: HOME | End: 2022-09-21
Attending: OBSTETRICS & GYNECOLOGY
Payer: COMMERCIAL

## 2022-09-21 ENCOUNTER — OFFICE VISIT (OUTPATIENT)
Dept: MATERNAL FETAL MEDICINE | Facility: CLINIC | Age: 37
End: 2022-09-21
Attending: OBSTETRICS & GYNECOLOGY
Payer: COMMERCIAL

## 2022-09-21 DIAGNOSIS — Z98.890 HISTORY OF LEEP (LOOP ELECTROSURGICAL EXCISION PROCEDURE) OF CERVIX COMPLICATING PREGNANCY IN SECOND TRIMESTER: ICD-10-CM

## 2022-09-21 DIAGNOSIS — O34.42 HISTORY OF LEEP (LOOP ELECTROSURGICAL EXCISION PROCEDURE) OF CERVIX COMPLICATING PREGNANCY IN SECOND TRIMESTER: ICD-10-CM

## 2022-09-21 DIAGNOSIS — O09.521 MULTIGRAVIDA OF ADVANCED MATERNAL AGE IN FIRST TRIMESTER: ICD-10-CM

## 2022-09-21 DIAGNOSIS — O09.522 MULTIGRAVIDA OF ADVANCED MATERNAL AGE IN SECOND TRIMESTER: Primary | ICD-10-CM

## 2022-09-21 PROCEDURE — 76817 TRANSVAGINAL US OBSTETRIC: CPT | Mod: 26 | Performed by: OBSTETRICS & GYNECOLOGY

## 2022-09-21 PROCEDURE — 76811 OB US DETAILED SNGL FETUS: CPT | Mod: 26 | Performed by: OBSTETRICS & GYNECOLOGY

## 2022-09-21 PROCEDURE — 76811 OB US DETAILED SNGL FETUS: CPT

## 2022-09-21 NOTE — PROGRESS NOTES
The patient was seen for an ultrasound in the Maternal-Fetal Medicine Center at the [facility] today.  For a detailed report of the ultrasound examination, please see the ultrasound report which can be found under the imaging tab.    Netta Mariscal MD  , OB/GYN  Maternal-Fetal Medicine  [phone] (Pager)

## 2022-10-07 ENCOUNTER — PRENATAL OFFICE VISIT (OUTPATIENT)
Dept: OBGYN | Facility: CLINIC | Age: 37
End: 2022-10-07
Payer: COMMERCIAL

## 2022-10-07 VITALS — BODY MASS INDEX: 26.78 KG/M2 | DIASTOLIC BLOOD PRESSURE: 74 MMHG | WEIGHT: 176.1 LBS | SYSTOLIC BLOOD PRESSURE: 118 MMHG

## 2022-10-07 DIAGNOSIS — Z98.890 HISTORY OF LOOP ELECTROSURGICAL EXCISION PROCEDURE (LEEP) OF CERVIX AFFECTING PREGNANCY IN SECOND TRIMESTER: ICD-10-CM

## 2022-10-07 DIAGNOSIS — Z23 NEED FOR PROPHYLACTIC VACCINATION AND INOCULATION AGAINST INFLUENZA: ICD-10-CM

## 2022-10-07 DIAGNOSIS — O34.42 HISTORY OF LOOP ELECTROSURGICAL EXCISION PROCEDURE (LEEP) OF CERVIX AFFECTING PREGNANCY IN SECOND TRIMESTER: ICD-10-CM

## 2022-10-07 DIAGNOSIS — Z23 ENCOUNTER FOR IMMUNIZATION: ICD-10-CM

## 2022-10-07 DIAGNOSIS — O09.522 MULTIGRAVIDA OF ADVANCED MATERNAL AGE IN SECOND TRIMESTER: Primary | ICD-10-CM

## 2022-10-07 PROCEDURE — 90471 IMMUNIZATION ADMIN: CPT | Performed by: FAMILY MEDICINE

## 2022-10-07 PROCEDURE — 99207 PR PRENATAL VISIT: CPT | Performed by: FAMILY MEDICINE

## 2022-10-07 PROCEDURE — 0124A COVID-19,PF,PFIZER BOOSTER BIVALENT: CPT | Performed by: FAMILY MEDICINE

## 2022-10-07 PROCEDURE — 90686 IIV4 VACC NO PRSV 0.5 ML IM: CPT | Performed by: FAMILY MEDICINE

## 2022-10-07 PROCEDURE — 91312 COVID-19,PF,PFIZER BOOSTER BIVALENT: CPT | Performed by: FAMILY MEDICINE

## 2022-10-07 NOTE — PROGRESS NOTES
CC: Here for routine prenatal visit   36 year old y/o  @ 20w6d with Estimated Date of Delivery: 2023     /74   Wt 79.9 kg (176 lb 1.6 oz)   LMP 2022 (Exact Date)   BMI 26.78 kg/m    See OB flowsheet  + fetal movement, no contractions, no bleeding, no loss of fluid   Discussed monitoring fetal movement     1) concerns: none   Covid-19 concerns: covid infection and vaccination recommendations discussed.   2) Routine: Blood type A+ RI tdap [x] flu [x] GS [x] NIPT [x] AFP [x]   Covid v [v and b, 2nd b today] gtt [ ]  3) Risk factors:   A: AMA: Level II us   B: Hx of Leep since last pgy: normal CL on anatomy us  4) Return: 4 weeks    [facility]

## 2022-10-07 NOTE — PATIENT INSTRUCTIONS
"Return 4 weeks  Return to clinic:  every 4 weeks till 28 weeks, then every 2 weeks till 36 weeks, then weekly till delivery      Phone numbers [place]:  Day/ night [phone] ask for ob triage  Emergency:  Call labor and delivery:  [phone]    What should I call about??    Contraction every 5 minutes for 1 hour 1 minute long (511), bleeding, loss of fluid, headache that doesn't resolve with tylenol, and decreased fetal movement     Start kick counts @ 26-28 weeks   There is an erin for this!  It is called \"count the kicks\"  Keep track of movement and discover your normal baby movement pattern   guideline is listed below  Please call if you do not feel the baby move!  We will have you come in for fetal heart rate monitoring:   Perception of at least 10 FMs during 12 hours of normal maternal activity   Perception of least 10 FMs over two hours when the mother is at rest and focused on [facility] Address   [address], [place], [place] [address] [phone]    Dr. Coco Dee, DO    OB/GYN   [facility]                                                    "

## 2022-11-04 ENCOUNTER — PRENATAL OFFICE VISIT (OUTPATIENT)
Dept: OBGYN | Facility: CLINIC | Age: 37
End: 2022-11-04
Payer: COMMERCIAL

## 2022-11-04 VITALS — SYSTOLIC BLOOD PRESSURE: 118 MMHG | DIASTOLIC BLOOD PRESSURE: 62 MMHG | BODY MASS INDEX: 27.83 KG/M2 | WEIGHT: 183 LBS

## 2022-11-04 DIAGNOSIS — Z98.890 HISTORY OF LOOP ELECTROSURGICAL EXCISION PROCEDURE (LEEP) OF CERVIX AFFECTING PREGNANCY IN SECOND TRIMESTER: ICD-10-CM

## 2022-11-04 DIAGNOSIS — O34.42 HISTORY OF LOOP ELECTROSURGICAL EXCISION PROCEDURE (LEEP) OF CERVIX AFFECTING PREGNANCY IN SECOND TRIMESTER: ICD-10-CM

## 2022-11-04 DIAGNOSIS — O09.522 MULTIGRAVIDA OF ADVANCED MATERNAL AGE IN SECOND TRIMESTER: Primary | ICD-10-CM

## 2022-11-04 PROCEDURE — 99207 PR COMPLICATED OB VISIT: CPT | Performed by: OBSTETRICS & GYNECOLOGY

## 2022-11-04 NOTE — NURSING NOTE
"Chief Complaint   Patient presents with     Prenatal Care     24 weeks 6 days- no concerns        Initial /62 (BP Location: Right arm, Patient Position: Sitting, Cuff Size: Adult Regular)   Wt 83 kg (183 lb)   LMP 2022 (Exact Date)   BMI 27.83 kg/m   Estimated body mass index is 27.83 kg/m  as calculated from the following:    Height as of 18: 1.727 m (5' 8\").    Weight as of this encounter: 83 kg (183 lb).  BP completed using cuff size: regular    Questioned patient about current smoking habits.  Pt. has never smoked.          The following HM Due: NONE    24w6d  Dago Kelsey CMA                "

## 2022-11-04 NOTE — PROGRESS NOTES
No c/o's.  28 week labs, TDaP at next visit.   labor/Premature rupture of membranes precautions reviewed.  RTC 4 weeks.    Encounter Diagnoses   Name Primary?     Multigravida of advanced maternal age in second trimester Yes     History of loop electrosurgical excision procedure (LEEP) of cervix affecting pregnancy in second trimester        Risk factors listed above are stable and being addressed as noted.    Edmundo Driscoll MD  [facility] [place]

## 2022-12-02 ENCOUNTER — PRENATAL OFFICE VISIT (OUTPATIENT)
Dept: OBGYN | Facility: CLINIC | Age: 37
End: 2022-12-02
Payer: COMMERCIAL

## 2022-12-02 VITALS — BODY MASS INDEX: 28.89 KG/M2 | WEIGHT: 190 LBS | DIASTOLIC BLOOD PRESSURE: 70 MMHG | SYSTOLIC BLOOD PRESSURE: 122 MMHG

## 2022-12-02 DIAGNOSIS — O34.43 HISTORY OF LOOP ELECTROSURGICAL EXCISION PROCEDURE (LEEP) OF CERVIX AFFECTING PREGNANCY IN THIRD TRIMESTER: ICD-10-CM

## 2022-12-02 DIAGNOSIS — O09.523 MULTIGRAVIDA OF ADVANCED MATERNAL AGE IN THIRD TRIMESTER: Primary | ICD-10-CM

## 2022-12-02 DIAGNOSIS — Z98.890 HISTORY OF LOOP ELECTROSURGICAL EXCISION PROCEDURE (LEEP) OF CERVIX AFFECTING PREGNANCY IN THIRD TRIMESTER: ICD-10-CM

## 2022-12-02 LAB
ERYTHROCYTE [DISTWIDTH] IN BLOOD BY AUTOMATED COUNT: 12.6 % (ref 10–15)
GLUCOSE 1H P 50 G GLC PO SERPL-MCNC: 82 MG/DL (ref 70–129)
HCT VFR BLD AUTO: 36 % (ref 35–47)
HGB BLD-MCNC: 12.7 G/DL (ref 11.7–15.7)
MCH RBC QN AUTO: 32.5 PG (ref 26.5–33)
MCHC RBC AUTO-ENTMCNC: 35.3 G/DL (ref 31.5–36.5)
MCV RBC AUTO: 92 FL (ref 78–100)
PLATELET # BLD AUTO: 207 10E3/UL (ref 150–450)
RBC # BLD AUTO: 3.91 10E6/UL (ref 3.8–5.2)
WBC # BLD AUTO: 7.6 10E3/UL (ref 4–11)

## 2022-12-02 PROCEDURE — 82950 GLUCOSE TEST: CPT | Performed by: OBSTETRICS & GYNECOLOGY

## 2022-12-02 PROCEDURE — 90471 IMMUNIZATION ADMIN: CPT | Performed by: OBSTETRICS & GYNECOLOGY

## 2022-12-02 PROCEDURE — 90715 TDAP VACCINE 7 YRS/> IM: CPT | Performed by: OBSTETRICS & GYNECOLOGY

## 2022-12-02 PROCEDURE — 86780 TREPONEMA PALLIDUM: CPT | Performed by: OBSTETRICS & GYNECOLOGY

## 2022-12-02 PROCEDURE — 36415 COLL VENOUS BLD VENIPUNCTURE: CPT | Performed by: OBSTETRICS & GYNECOLOGY

## 2022-12-02 PROCEDURE — 99207 PR COMPLICATED OB VISIT: CPT | Performed by: OBSTETRICS & GYNECOLOGY

## 2022-12-02 PROCEDURE — 85027 COMPLETE CBC AUTOMATED: CPT | Performed by: OBSTETRICS & GYNECOLOGY

## 2022-12-02 NOTE — PROGRESS NOTES
No c/o's. 28 week labs, TDaP today.  Will plan to do an U/S for growth at 36 weeks due to AMA.   labor/premature rupture of membranes precautions reviewed.  RTC 2 week(s).    Encounter Diagnoses   Name Primary?     Multigravida of advanced maternal age in third trimester Yes     History of loop electrosurgical excision procedure (LEEP) of cervix affecting pregnancy in third trimester        Risk factors listed above are stable and being addressed as noted.    Edmundo Driscoll MD  [facility] [place]

## 2022-12-02 NOTE — NURSING NOTE
"Chief Complaint   Patient presents with     Prenatal Care     28 weeks 6 days- no concerns        Initial /70 (BP Location: Right arm, Patient Position: Sitting, Cuff Size: Adult Regular)   Wt 86.2 kg (190 lb)   LMP 2022 (Exact Date)   BMI 28.89 kg/m   Estimated body mass index is 28.89 kg/m  as calculated from the following:    Height as of 18: 1.727 m (5' 8\").    Weight as of this encounter: 86.2 kg (190 lb).  BP completed using cuff size: regular    Questioned patient about current smoking habits.  Pt. has never smoked.          The following HM Due: NONE    28w6d  Dago Kelsey CMA              "

## 2022-12-03 LAB — T PALLIDUM AB SER QL: NONREACTIVE

## 2022-12-12 ENCOUNTER — PRENATAL OFFICE VISIT (OUTPATIENT)
Dept: OBGYN | Facility: CLINIC | Age: 37
End: 2022-12-12
Payer: COMMERCIAL

## 2022-12-12 VITALS — WEIGHT: 194 LBS | BODY MASS INDEX: 29.5 KG/M2 | SYSTOLIC BLOOD PRESSURE: 128 MMHG | DIASTOLIC BLOOD PRESSURE: 76 MMHG

## 2022-12-12 DIAGNOSIS — O09.523 MULTIGRAVIDA OF ADVANCED MATERNAL AGE IN THIRD TRIMESTER: Primary | ICD-10-CM

## 2022-12-12 DIAGNOSIS — O34.43 HISTORY OF LOOP ELECTROSURGICAL EXCISION PROCEDURE (LEEP) OF CERVIX AFFECTING PREGNANCY IN THIRD TRIMESTER: ICD-10-CM

## 2022-12-12 DIAGNOSIS — Z98.890 HISTORY OF LOOP ELECTROSURGICAL EXCISION PROCEDURE (LEEP) OF CERVIX AFFECTING PREGNANCY IN THIRD TRIMESTER: ICD-10-CM

## 2022-12-12 PROCEDURE — 99207 PR COMPLICATED OB VISIT: CPT | Performed by: OBSTETRICS & GYNECOLOGY

## 2022-12-12 NOTE — PROGRESS NOTES
No c/o's. Schedule U/S EFW at 36 weeks.  Fetal movement counts BID,  labor/premature rupture of membranes precautions reviewed.  RTC 2 week(s).    Encounter Diagnoses   Name Primary?     Multigravida of advanced maternal age in third trimester Yes     History of loop electrosurgical excision procedure (LEEP) of cervix affecting pregnancy in third trimester        Risk factors listed above are stable and being addressed as noted.    Edmundo Driscoll MD  [facility] [place]

## 2022-12-12 NOTE — NURSING NOTE
"Chief Complaint   Patient presents with     Prenatal Care     30 weeks 2 days- no concerns        Initial /76 (BP Location: Right arm, Patient Position: Sitting, Cuff Size: Adult Large)   Wt 88 kg (194 lb)   LMP 2022 (Exact Date)   BMI 29.50 kg/m   Estimated body mass index is 29.5 kg/m  as calculated from the following:    Height as of 18: 1.727 m (5' 8\").    Weight as of this encounter: 88 kg (194 lb).  BP completed using cuff size: regular    Questioned patient about current smoking habits.  Pt. has never smoked.          The following HM Due: NONE    30w2d  Dago Kelsey CMA                "

## 2022-12-18 ENCOUNTER — OFFICE VISIT (OUTPATIENT)
Dept: URGENT CARE | Facility: URGENT CARE | Age: 37
End: 2022-12-18
Payer: COMMERCIAL

## 2022-12-18 VITALS
SYSTOLIC BLOOD PRESSURE: 135 MMHG | WEIGHT: 195.6 LBS | OXYGEN SATURATION: 98 % | RESPIRATION RATE: 18 BRPM | HEART RATE: 88 BPM | BODY MASS INDEX: 29.74 KG/M2 | TEMPERATURE: 98.2 F | DIASTOLIC BLOOD PRESSURE: 82 MMHG

## 2022-12-18 DIAGNOSIS — J06.9 VIRAL URI WITH COUGH: ICD-10-CM

## 2022-12-18 DIAGNOSIS — R07.0 THROAT PAIN: Primary | ICD-10-CM

## 2022-12-18 LAB
DEPRECATED S PYO AG THROAT QL EIA: NEGATIVE
GROUP A STREP BY PCR: NOT DETECTED

## 2022-12-18 PROCEDURE — 87651 STREP A DNA AMP PROBE: CPT | Performed by: NURSE PRACTITIONER

## 2022-12-18 PROCEDURE — 99213 OFFICE O/P EST LOW 20 MIN: CPT | Performed by: NURSE PRACTITIONER

## 2022-12-18 NOTE — PATIENT INSTRUCTIONS
Results for orders placed or performed in visit on 12/18/22   Streptococcus A Rapid Screen w/Reflex to PCR - Clinic Collect     Status: Normal    Specimen: Throat; Swab   Result Value Ref Range    Group A Strep antigen Negative Negative         RST negative  TC swab pending.    Push fluids  Lots of handwashing.   Ibuprofen as needed for fever or pain   dayquil/nyquil for cough as needed     Rest as able.   Will call if any other labs positive.    F/u in the clinic if symptoms persist or worsen.

## 2022-12-18 NOTE — PROGRESS NOTES
Assessment & Plan       ICD-10-CM    1. Throat pain  R07.0 Streptococcus A Rapid Screen w/Reflex to PCR - Clinic Collect     Group A Streptococcus PCR Throat Swab      2. Viral URI with cough  J06.9         Patient Instructions     Results for orders placed or performed in visit on 12/18/22   Streptococcus A Rapid Screen w/Reflex to PCR - Clinic Collect     Status: Normal    Specimen: Throat; Swab   Result Value Ref Range    Group A Strep antigen Negative Negative         RST negative  TC swab pending.    Push fluids  Lots of handwashing.   Ibuprofen as needed for fever or pain   dayquil/nyquil for cough as needed     Rest as able.   Will call if any other labs positive.    F/u in the clinic if symptoms persist or worsen.          Return in about 1 week (around 12/25/2022) for with regular provider if symptoms persist.    KELLY Houston CNP  [facility] SHARI Klein is a 37 year old female who presents to clinic today for the following health issues:  Chief Complaint   Patient presents with     Pharyngitis     Sore throat, headache, cough and ear pain for the last 5x days.       HPI    URI Adult    Onset of symptoms was 5 day(s) ago.  Course of illness is same.    Severity moderate  Current and Associated symptoms: cough - non-productive, ear pain both, sore throat and headache  Treatment measures tried include OTC Cough med, Fluids and Rest.  Predisposing factors include ill contact: Family member .  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  Wants to make sure it is not strep for the pregancy     Review of Systems  Constitutional, HEENT, cardiovascular, pulmonary, GI, , musculoskeletal, neuro, skin, endocrine and psych systems are negative, except as otherwise noted.      Objective    /82 (BP Location: Left arm, Patient Position: Sitting, Cuff Size: Adult Regular)   Pulse 88   Temp 98.2  F (36.8  C) (Oral)   Resp 18   Wt 88.7 kg (195 lb 9.6 oz)   LMP  05/14/2022 (Exact Date)   SpO2 98%   BMI 29.74 kg/m    Physical Exam   GENERAL: healthy, alert and no distress  EYES: Eyes grossly normal to inspection, PERRL and conjunctivae and sclerae normal  HENT: normal cephalic/atraumatic, ear canals and TM's normal, nose and mouth without ulcers or lesions, oropharynx clear, oral mucous membranes moist and tonsillar exudate  NECK: no adenopathy, no asymmetry, masses, or scars and thyroid normal to palpation  RESP: lungs clear to auscultation - no rales, rhonchi or wheezes  CV: regular rate and rhythm, normal S1 S2, no S3 or S4, no murmur, click or rub, no peripheral edema and peripheral pulses strong  MS: no gross musculoskeletal defects noted, no edema  SKIN: no suspicious lesions or rashes

## 2022-12-30 ENCOUNTER — PRENATAL OFFICE VISIT (OUTPATIENT)
Dept: OBGYN | Facility: CLINIC | Age: 37
End: 2022-12-30
Payer: COMMERCIAL

## 2022-12-30 VITALS — SYSTOLIC BLOOD PRESSURE: 132 MMHG | WEIGHT: 195 LBS | BODY MASS INDEX: 29.65 KG/M2 | DIASTOLIC BLOOD PRESSURE: 78 MMHG

## 2022-12-30 DIAGNOSIS — O34.43 HISTORY OF LOOP ELECTROSURGICAL EXCISION PROCEDURE (LEEP) OF CERVIX AFFECTING PREGNANCY IN THIRD TRIMESTER: ICD-10-CM

## 2022-12-30 DIAGNOSIS — Z98.890 HISTORY OF LOOP ELECTROSURGICAL EXCISION PROCEDURE (LEEP) OF CERVIX AFFECTING PREGNANCY IN THIRD TRIMESTER: ICD-10-CM

## 2022-12-30 DIAGNOSIS — O09.523 MULTIGRAVIDA OF ADVANCED MATERNAL AGE IN THIRD TRIMESTER: Primary | ICD-10-CM

## 2022-12-30 PROCEDURE — 99207 PR COMPLICATED OB VISIT: CPT | Performed by: OBSTETRICS & GYNECOLOGY

## 2022-12-30 NOTE — PROGRESS NOTES
No c/o's.  U/S at 36 wks for EFW.  Fetal movement counts BID,  labor/premature rupture of membranes precautions reviewed.  RTC 2 week(s).    Encounter Diagnoses   Name Primary?     Multigravida of advanced maternal age in third trimester Yes     History of loop electrosurgical excision procedure (LEEP) of cervix affecting pregnancy in third trimester        Risk factors listed above are stable and being addressed as noted.    Edmundo Driscoll MD  [facility] [place]

## 2022-12-30 NOTE — NURSING NOTE
"Chief Complaint   Patient presents with     Prenatal Care     32 weeks 6 days- no concerns        Initial /78 (BP Location: Right arm, Patient Position: Sitting, Cuff Size: Adult Regular)   Wt 88.5 kg (195 lb)   LMP 2022 (Exact Date)   BMI 29.65 kg/m   Estimated body mass index is 29.65 kg/m  as calculated from the following:    Height as of 18: 1.727 m (5' 8\").    Weight as of this encounter: 88.5 kg (195 lb).  BP completed using cuff size: regular    Questioned patient about current smoking habits.  Pt. has never smoked.          32w6d  Dago Kelsey CMA                "

## 2023-01-09 ENCOUNTER — PRENATAL OFFICE VISIT (OUTPATIENT)
Dept: OBGYN | Facility: CLINIC | Age: 38
End: 2023-01-09
Payer: COMMERCIAL

## 2023-01-09 VITALS — WEIGHT: 200 LBS | DIASTOLIC BLOOD PRESSURE: 88 MMHG | SYSTOLIC BLOOD PRESSURE: 128 MMHG | BODY MASS INDEX: 30.41 KG/M2

## 2023-01-09 DIAGNOSIS — O09.523 MULTIGRAVIDA OF ADVANCED MATERNAL AGE IN THIRD TRIMESTER: Primary | ICD-10-CM

## 2023-01-09 DIAGNOSIS — Z98.890 HISTORY OF LOOP ELECTROSURGICAL EXCISION PROCEDURE (LEEP) OF CERVIX AFFECTING PREGNANCY IN THIRD TRIMESTER: ICD-10-CM

## 2023-01-09 DIAGNOSIS — O34.43 HISTORY OF LOOP ELECTROSURGICAL EXCISION PROCEDURE (LEEP) OF CERVIX AFFECTING PREGNANCY IN THIRD TRIMESTER: ICD-10-CM

## 2023-01-09 PROCEDURE — 99207 PR COMPLICATED OB VISIT: CPT | Performed by: OBSTETRICS & GYNECOLOGY

## 2023-01-09 NOTE — NURSING NOTE
"Chief Complaint   Patient presents with     Prenatal Care     34 weeks 2 days- no concerns        Initial /88 (BP Location: Right arm, Patient Position: Sitting, Cuff Size: Adult Large)   Wt 90.7 kg (200 lb)   LMP 2022 (Exact Date)   BMI 30.41 kg/m   Estimated body mass index is 30.41 kg/m  as calculated from the following:    Height as of 18: 1.727 m (5' 8\").    Weight as of this encounter: 90.7 kg (200 lb).  BP completed using cuff size: large    Questioned patient about current smoking habits.  Pt. has never smoked.          The following HM Due: NONE    34w2d  Dago Kelsey CMA                "

## 2023-01-09 NOTE — PROGRESS NOTES
No c/o's.  U/S 2023 for EFW.  GBS at next visit.  Fetal movement counts BID,  labor/premature rupture of membranes precautions reviewed.  RTC 2 week(s).    Encounter Diagnoses   Name Primary?     Multigravida of advanced maternal age in third trimester Yes     History of loop electrosurgical excision procedure (LEEP) of cervix affecting pregnancy in third trimester        Risk factors listed above are stable and being addressed as noted.    Edmundo Driscoll MD  [facility] [place]

## 2023-01-18 ENCOUNTER — ANCILLARY PROCEDURE (OUTPATIENT)
Dept: ULTRASOUND IMAGING | Facility: CLINIC | Age: 38
End: 2023-01-18
Attending: OBSTETRICS & GYNECOLOGY
Payer: COMMERCIAL

## 2023-01-18 DIAGNOSIS — O09.523 MULTIGRAVIDA OF ADVANCED MATERNAL AGE IN THIRD TRIMESTER: ICD-10-CM

## 2023-01-18 PROCEDURE — 76816 OB US FOLLOW-UP PER FETUS: CPT | Performed by: OBSTETRICS & GYNECOLOGY

## 2023-01-23 ENCOUNTER — PRENATAL OFFICE VISIT (OUTPATIENT)
Dept: OBGYN | Facility: CLINIC | Age: 38
End: 2023-01-23
Payer: COMMERCIAL

## 2023-01-23 VITALS — WEIGHT: 199 LBS | SYSTOLIC BLOOD PRESSURE: 122 MMHG | DIASTOLIC BLOOD PRESSURE: 78 MMHG | BODY MASS INDEX: 30.26 KG/M2

## 2023-01-23 DIAGNOSIS — O34.43 HISTORY OF LOOP ELECTROSURGICAL EXCISION PROCEDURE (LEEP) OF CERVIX AFFECTING PREGNANCY IN THIRD TRIMESTER: ICD-10-CM

## 2023-01-23 DIAGNOSIS — Z98.890 HISTORY OF LOOP ELECTROSURGICAL EXCISION PROCEDURE (LEEP) OF CERVIX AFFECTING PREGNANCY IN THIRD TRIMESTER: ICD-10-CM

## 2023-01-23 DIAGNOSIS — O09.523 MULTIGRAVIDA OF ADVANCED MATERNAL AGE IN THIRD TRIMESTER: Primary | ICD-10-CM

## 2023-01-23 PROCEDURE — 99207 PR COMPLICATED OB VISIT: CPT | Performed by: OBSTETRICS & GYNECOLOGY

## 2023-01-23 PROCEDURE — 87653 STREP B DNA AMP PROBE: CPT | Performed by: OBSTETRICS & GYNECOLOGY

## 2023-01-23 NOTE — PROGRESS NOTES
No c/o's.  GBS done.  U/S (2023) - 52%, AFV WNL, Cephalic.  Cx-0/80/-2/Vtx.  Fetal movement counts BID,  labor/premature rupture of membranes precautions reviewed.  RTC 1 week(s).    Encounter Diagnoses   Name Primary?     Multigravida of advanced maternal age in third trimester Yes     History of loop electrosurgical excision procedure (LEEP) of cervix affecting pregnancy in third trimester        Risk factors listed above are stable and being addressed as noted.    Edmundo Driscoll MD  [facility] [place]

## 2023-01-23 NOTE — NURSING NOTE
"Chief Complaint   Patient presents with     Prenatal Care     36 weeks 2 days- no concerns        Initial /78 (BP Location: Right arm, Patient Position: Sitting, Cuff Size: Adult Regular)   Wt 90.3 kg (199 lb)   LMP 2022 (Exact Date)   BMI 30.26 kg/m   Estimated body mass index is 30.26 kg/m  as calculated from the following:    Height as of 18: 1.727 m (5' 8\").    Weight as of this encounter: 90.3 kg (199 lb).  BP completed using cuff size: regular    Questioned patient about current smoking habits.  Pt. has never smoked.          The following HM Due: NONE    36w2d  Dago Kelsey CMA              "

## 2023-01-24 LAB — GP B STREP DNA SPEC QL NAA+PROBE: NEGATIVE

## 2023-01-30 ENCOUNTER — PRENATAL OFFICE VISIT (OUTPATIENT)
Dept: OBGYN | Facility: CLINIC | Age: 38
End: 2023-01-30
Payer: COMMERCIAL

## 2023-01-30 VITALS — BODY MASS INDEX: 31.02 KG/M2 | DIASTOLIC BLOOD PRESSURE: 78 MMHG | SYSTOLIC BLOOD PRESSURE: 122 MMHG | WEIGHT: 204 LBS

## 2023-01-30 DIAGNOSIS — O34.43 HISTORY OF LOOP ELECTROSURGICAL EXCISION PROCEDURE (LEEP) OF CERVIX AFFECTING PREGNANCY IN THIRD TRIMESTER: ICD-10-CM

## 2023-01-30 DIAGNOSIS — O09.523 MULTIGRAVIDA OF ADVANCED MATERNAL AGE IN THIRD TRIMESTER: Primary | ICD-10-CM

## 2023-01-30 DIAGNOSIS — Z98.890 HISTORY OF LOOP ELECTROSURGICAL EXCISION PROCEDURE (LEEP) OF CERVIX AFFECTING PREGNANCY IN THIRD TRIMESTER: ICD-10-CM

## 2023-01-30 PROCEDURE — 99207 PR COMPLICATED OB VISIT: CPT | Performed by: OBSTETRICS & GYNECOLOGY

## 2023-01-30 NOTE — NURSING NOTE
"Chief Complaint   Patient presents with     Prenatal Care       Initial /78   Wt 92.5 kg (204 lb)   LMP 2022 (Exact Date)   BMI 31.02 kg/m   Estimated body mass index is 31.02 kg/m  as calculated from the following:    Height as of 18: 1.727 m (5' 8\").    Weight as of this encounter: 92.5 kg (204 lb).  BP completed using cuff size: regular    Questioned patient about current smoking habits.  Pt. has never smoked.          37w2d  + FM daily  + emilee ward contractions  - bleeding  - edema  Willow Kendall LPN                "

## 2023-01-30 NOTE — PROGRESS NOTES
No c/o's. Cx-1/80/-2/Vtx.  Fetal movement counts BID, rupture of membranes/labor precautions reviewed.  RTC 1 week(s).    Encounter Diagnoses   Name Primary?     Multigravida of advanced maternal age in third trimester Yes     History of loop electrosurgical excision procedure (LEEP) of cervix affecting pregnancy in third trimester        Risk factors listed above are stable and being addressed as noted.    Edmundo Driscoll MD  [facility] [place]

## 2023-02-10 ENCOUNTER — PRENATAL OFFICE VISIT (OUTPATIENT)
Dept: OBGYN | Facility: CLINIC | Age: 38
End: 2023-02-10
Payer: COMMERCIAL

## 2023-02-10 VITALS
DIASTOLIC BLOOD PRESSURE: 82 MMHG | WEIGHT: 206 LBS | SYSTOLIC BLOOD PRESSURE: 126 MMHG | HEIGHT: 68 IN | BODY MASS INDEX: 31.22 KG/M2

## 2023-02-10 DIAGNOSIS — Z98.890 HISTORY OF LOOP ELECTROSURGICAL EXCISION PROCEDURE (LEEP) OF CERVIX AFFECTING PREGNANCY IN THIRD TRIMESTER: ICD-10-CM

## 2023-02-10 DIAGNOSIS — O09.523 MULTIGRAVIDA OF ADVANCED MATERNAL AGE IN THIRD TRIMESTER: Primary | ICD-10-CM

## 2023-02-10 DIAGNOSIS — O34.43 HISTORY OF LOOP ELECTROSURGICAL EXCISION PROCEDURE (LEEP) OF CERVIX AFFECTING PREGNANCY IN THIRD TRIMESTER: ICD-10-CM

## 2023-02-10 PROCEDURE — 99207 PR COMPLICATED OB VISIT: CPT | Performed by: OBSTETRICS & GYNECOLOGY

## 2023-02-10 NOTE — PROGRESS NOTES
No c/o's.  Cx-2/80/-2/Vtx.  Fetal movement counts BID, rupture of membranes/labor precautions reviewed.   Desires induction 2/15.  Will schedule for that day.    Encounter Diagnoses   Name Primary?     Multigravida of advanced maternal age in third trimester Yes     History of loop electrosurgical excision procedure (LEEP) of cervix affecting pregnancy in third trimester        Risk factors listed above are stable and being addressed as noted.    Edmundo Driscoll MD  [facility] [place]

## 2023-02-10 NOTE — NURSING NOTE
"Chief Complaint   Patient presents with     Prenatal Care     38 6/7 weeks       Initial /82 (BP Location: Right arm, Patient Position: Chair, Cuff Size: Adult Large)   Ht 1.727 m (5' 8\")   Wt 93.4 kg (206 lb)   LMP 2022 (Exact Date)   Breastfeeding No   BMI 31.32 kg/m   Estimated body mass index is 31.32 kg/m  as calculated from the following:    Height as of this encounter: 1.727 m (5' 8\").    Weight as of this encounter: 93.4 kg (206 lb).  BP completed using cuff size: large    Questioned patient about current smoking habits.  Pt. has never smoked.          The following HM Due: NONE    +fetal movement  -swelling    Ashlie Salazar, CMA    "

## 2023-02-11 ENCOUNTER — NURSE TRIAGE (OUTPATIENT)
Dept: NURSING | Facility: CLINIC | Age: 38
End: 2023-02-11
Payer: COMMERCIAL

## 2023-02-11 ENCOUNTER — ANESTHESIA (OUTPATIENT)
Dept: OBGYN | Facility: CLINIC | Age: 38
End: 2023-02-11
Payer: COMMERCIAL

## 2023-02-11 ENCOUNTER — ANESTHESIA EVENT (OUTPATIENT)
Dept: OBGYN | Facility: CLINIC | Age: 38
End: 2023-02-11
Payer: COMMERCIAL

## 2023-02-11 ENCOUNTER — HOSPITAL ENCOUNTER (INPATIENT)
Facility: CLINIC | Age: 38
LOS: 2 days | Discharge: HOME HEALTH | End: 2023-02-13
Attending: OBSTETRICS & GYNECOLOGY | Admitting: OBSTETRICS & GYNECOLOGY
Payer: COMMERCIAL

## 2023-02-11 LAB
ABO/RH(D): NORMAL
ANTIBODY SCREEN: NEGATIVE
HGB BLD-MCNC: 13.3 G/DL (ref 11.7–15.7)
SPECIMEN EXPIRATION DATE: NORMAL

## 2023-02-11 PROCEDURE — 86901 BLOOD TYPING SEROLOGIC RH(D): CPT | Performed by: OBSTETRICS & GYNECOLOGY

## 2023-02-11 PROCEDURE — 00HU33Z INSERTION OF INFUSION DEVICE INTO SPINAL CANAL, PERCUTANEOUS APPROACH: ICD-10-PCS | Performed by: ANESTHESIOLOGY

## 2023-02-11 PROCEDURE — 59400 OBSTETRICAL CARE: CPT | Performed by: OBSTETRICS & GYNECOLOGY

## 2023-02-11 PROCEDURE — 120N000001 HC R&B MED SURG/OB

## 2023-02-11 PROCEDURE — 250N000011 HC RX IP 250 OP 636: Performed by: ANESTHESIOLOGY

## 2023-02-11 PROCEDURE — 85018 HEMOGLOBIN: CPT | Performed by: OBSTETRICS & GYNECOLOGY

## 2023-02-11 PROCEDURE — 86780 TREPONEMA PALLIDUM: CPT | Performed by: OBSTETRICS & GYNECOLOGY

## 2023-02-11 PROCEDURE — 86850 RBC ANTIBODY SCREEN: CPT | Performed by: OBSTETRICS & GYNECOLOGY

## 2023-02-11 PROCEDURE — 0HQ9XZZ REPAIR PERINEUM SKIN, EXTERNAL APPROACH: ICD-10-PCS | Performed by: OBSTETRICS & GYNECOLOGY

## 2023-02-11 PROCEDURE — 258N000003 HC RX IP 258 OP 636: Performed by: OBSTETRICS & GYNECOLOGY

## 2023-02-11 PROCEDURE — 722N000001 HC LABOR CARE VAGINAL DELIVERY SINGLE

## 2023-02-11 PROCEDURE — 370N000003 HC ANESTHESIA WARD SERVICE

## 2023-02-11 PROCEDURE — G0463 HOSPITAL OUTPT CLINIC VISIT: HCPCS

## 2023-02-11 PROCEDURE — 250N000009 HC RX 250: Performed by: OBSTETRICS & GYNECOLOGY

## 2023-02-11 PROCEDURE — 3E0R3BZ INTRODUCTION OF ANESTHETIC AGENT INTO SPINAL CANAL, PERCUTANEOUS APPROACH: ICD-10-PCS | Performed by: ANESTHESIOLOGY

## 2023-02-11 RX ORDER — MISOPROSTOL 200 UG/1
400 TABLET ORAL
Status: DISCONTINUED | OUTPATIENT
Start: 2023-02-11 | End: 2023-02-13 | Stop reason: HOSPADM

## 2023-02-11 RX ORDER — KETOROLAC TROMETHAMINE 30 MG/ML
30 INJECTION, SOLUTION INTRAMUSCULAR; INTRAVENOUS
Status: DISCONTINUED | OUTPATIENT
Start: 2023-02-11 | End: 2023-02-12

## 2023-02-11 RX ORDER — PROCHLORPERAZINE MALEATE 10 MG
10 TABLET ORAL EVERY 6 HOURS PRN
Status: DISCONTINUED | OUTPATIENT
Start: 2023-02-11 | End: 2023-02-11 | Stop reason: HOSPADM

## 2023-02-11 RX ORDER — DOCUSATE SODIUM 100 MG/1
100 CAPSULE, LIQUID FILLED ORAL DAILY
Status: DISCONTINUED | OUTPATIENT
Start: 2023-02-12 | End: 2023-02-13 | Stop reason: HOSPADM

## 2023-02-11 RX ORDER — MISOPROSTOL 200 UG/1
400 TABLET ORAL
Status: DISCONTINUED | OUTPATIENT
Start: 2023-02-11 | End: 2023-02-11 | Stop reason: HOSPADM

## 2023-02-11 RX ORDER — MISOPROSTOL 200 UG/1
800 TABLET ORAL
Status: DISCONTINUED | OUTPATIENT
Start: 2023-02-11 | End: 2023-02-13 | Stop reason: HOSPADM

## 2023-02-11 RX ORDER — TRANEXAMIC ACID 10 MG/ML
1 INJECTION, SOLUTION INTRAVENOUS EVERY 30 MIN PRN
Status: DISCONTINUED | OUTPATIENT
Start: 2023-02-11 | End: 2023-02-11 | Stop reason: HOSPADM

## 2023-02-11 RX ORDER — ACETAMINOPHEN 325 MG/1
650 TABLET ORAL EVERY 4 HOURS PRN
Status: DISCONTINUED | OUTPATIENT
Start: 2023-02-11 | End: 2023-02-11 | Stop reason: HOSPADM

## 2023-02-11 RX ORDER — OXYTOCIN 10 [USP'U]/ML
10 INJECTION, SOLUTION INTRAMUSCULAR; INTRAVENOUS
Status: DISCONTINUED | OUTPATIENT
Start: 2023-02-11 | End: 2023-02-13 | Stop reason: HOSPADM

## 2023-02-11 RX ORDER — IBUPROFEN 800 MG/1
800 TABLET, FILM COATED ORAL EVERY 6 HOURS PRN
Status: DISCONTINUED | OUTPATIENT
Start: 2023-02-11 | End: 2023-02-13 | Stop reason: HOSPADM

## 2023-02-11 RX ORDER — ONDANSETRON 4 MG/1
4 TABLET, ORALLY DISINTEGRATING ORAL EVERY 6 HOURS PRN
Status: DISCONTINUED | OUTPATIENT
Start: 2023-02-11 | End: 2023-02-11 | Stop reason: HOSPADM

## 2023-02-11 RX ORDER — HYDROCORTISONE 25 MG/G
CREAM TOPICAL 3 TIMES DAILY PRN
Status: DISCONTINUED | OUTPATIENT
Start: 2023-02-11 | End: 2023-02-13 | Stop reason: HOSPADM

## 2023-02-11 RX ORDER — OXYTOCIN 10 [USP'U]/ML
10 INJECTION, SOLUTION INTRAMUSCULAR; INTRAVENOUS
Status: DISCONTINUED | OUTPATIENT
Start: 2023-02-11 | End: 2023-02-12

## 2023-02-11 RX ORDER — SODIUM CHLORIDE, SODIUM LACTATE, POTASSIUM CHLORIDE, CALCIUM CHLORIDE 600; 310; 30; 20 MG/100ML; MG/100ML; MG/100ML; MG/100ML
INJECTION, SOLUTION INTRAVENOUS CONTINUOUS
Status: DISCONTINUED | OUTPATIENT
Start: 2023-02-11 | End: 2023-02-11 | Stop reason: HOSPADM

## 2023-02-11 RX ORDER — METOCLOPRAMIDE HYDROCHLORIDE 5 MG/ML
10 INJECTION INTRAMUSCULAR; INTRAVENOUS EVERY 6 HOURS PRN
Status: DISCONTINUED | OUTPATIENT
Start: 2023-02-11 | End: 2023-02-11 | Stop reason: HOSPADM

## 2023-02-11 RX ORDER — METHYLERGONOVINE MALEATE 0.2 MG/ML
200 INJECTION INTRAVENOUS
Status: DISCONTINUED | OUTPATIENT
Start: 2023-02-11 | End: 2023-02-11 | Stop reason: HOSPADM

## 2023-02-11 RX ORDER — OXYTOCIN/0.9 % SODIUM CHLORIDE 30/500 ML
PLASTIC BAG, INJECTION (ML) INTRAVENOUS
Status: DISCONTINUED
Start: 2023-02-11 | End: 2023-02-12 | Stop reason: HOSPADM

## 2023-02-11 RX ORDER — NALOXONE HYDROCHLORIDE 0.4 MG/ML
0.2 INJECTION, SOLUTION INTRAMUSCULAR; INTRAVENOUS; SUBCUTANEOUS
Status: DISCONTINUED | OUTPATIENT
Start: 2023-02-11 | End: 2023-02-11 | Stop reason: HOSPADM

## 2023-02-11 RX ORDER — CARBOPROST TROMETHAMINE 250 UG/ML
250 INJECTION, SOLUTION INTRAMUSCULAR
Status: DISCONTINUED | OUTPATIENT
Start: 2023-02-11 | End: 2023-02-13 | Stop reason: HOSPADM

## 2023-02-11 RX ORDER — OXYTOCIN/0.9 % SODIUM CHLORIDE 30/500 ML
340 PLASTIC BAG, INJECTION (ML) INTRAVENOUS CONTINUOUS PRN
Status: DISCONTINUED | OUTPATIENT
Start: 2023-02-11 | End: 2023-02-11 | Stop reason: HOSPADM

## 2023-02-11 RX ORDER — MISOPROSTOL 200 UG/1
800 TABLET ORAL
Status: DISCONTINUED | OUTPATIENT
Start: 2023-02-11 | End: 2023-02-11 | Stop reason: HOSPADM

## 2023-02-11 RX ORDER — ACETAMINOPHEN 325 MG/1
650 TABLET ORAL EVERY 4 HOURS PRN
Status: DISCONTINUED | OUTPATIENT
Start: 2023-02-11 | End: 2023-02-13 | Stop reason: HOSPADM

## 2023-02-11 RX ORDER — NALOXONE HYDROCHLORIDE 0.4 MG/ML
0.4 INJECTION, SOLUTION INTRAMUSCULAR; INTRAVENOUS; SUBCUTANEOUS
Status: DISCONTINUED | OUTPATIENT
Start: 2023-02-11 | End: 2023-02-11 | Stop reason: HOSPADM

## 2023-02-11 RX ORDER — CARBOPROST TROMETHAMINE 250 UG/ML
250 INJECTION, SOLUTION INTRAMUSCULAR
Status: DISCONTINUED | OUTPATIENT
Start: 2023-02-11 | End: 2023-02-11 | Stop reason: HOSPADM

## 2023-02-11 RX ORDER — TRANEXAMIC ACID 10 MG/ML
1 INJECTION, SOLUTION INTRAVENOUS EVERY 30 MIN PRN
Status: DISCONTINUED | OUTPATIENT
Start: 2023-02-11 | End: 2023-02-13 | Stop reason: HOSPADM

## 2023-02-11 RX ORDER — OXYTOCIN/0.9 % SODIUM CHLORIDE 30/500 ML
340 PLASTIC BAG, INJECTION (ML) INTRAVENOUS CONTINUOUS PRN
Status: DISCONTINUED | OUTPATIENT
Start: 2023-02-11 | End: 2023-02-13 | Stop reason: HOSPADM

## 2023-02-11 RX ORDER — MODIFIED LANOLIN
OINTMENT (GRAM) TOPICAL
Status: DISCONTINUED | OUTPATIENT
Start: 2023-02-11 | End: 2023-02-13 | Stop reason: HOSPADM

## 2023-02-11 RX ORDER — OXYTOCIN/0.9 % SODIUM CHLORIDE 30/500 ML
100-340 PLASTIC BAG, INJECTION (ML) INTRAVENOUS CONTINUOUS PRN
Status: DISCONTINUED | OUTPATIENT
Start: 2023-02-11 | End: 2023-02-12

## 2023-02-11 RX ORDER — IBUPROFEN 800 MG/1
800 TABLET, FILM COATED ORAL
Status: DISCONTINUED | OUTPATIENT
Start: 2023-02-11 | End: 2023-02-12

## 2023-02-11 RX ORDER — BISACODYL 10 MG
10 SUPPOSITORY, RECTAL RECTAL DAILY PRN
Status: DISCONTINUED | OUTPATIENT
Start: 2023-02-11 | End: 2023-02-13 | Stop reason: HOSPADM

## 2023-02-11 RX ORDER — OXYTOCIN 10 [USP'U]/ML
10 INJECTION, SOLUTION INTRAMUSCULAR; INTRAVENOUS
Status: DISCONTINUED | OUTPATIENT
Start: 2023-02-11 | End: 2023-02-11 | Stop reason: HOSPADM

## 2023-02-11 RX ORDER — CITRIC ACID/SODIUM CITRATE 334-500MG
30 SOLUTION, ORAL ORAL
Status: DISCONTINUED | OUTPATIENT
Start: 2023-02-11 | End: 2023-02-11 | Stop reason: HOSPADM

## 2023-02-11 RX ORDER — ONDANSETRON 2 MG/ML
4 INJECTION INTRAMUSCULAR; INTRAVENOUS EVERY 6 HOURS PRN
Status: DISCONTINUED | OUTPATIENT
Start: 2023-02-11 | End: 2023-02-11 | Stop reason: HOSPADM

## 2023-02-11 RX ORDER — METOCLOPRAMIDE 10 MG/1
10 TABLET ORAL EVERY 6 HOURS PRN
Status: DISCONTINUED | OUTPATIENT
Start: 2023-02-11 | End: 2023-02-11 | Stop reason: HOSPADM

## 2023-02-11 RX ORDER — LIDOCAINE HYDROCHLORIDE 10 MG/ML
INJECTION, SOLUTION EPIDURAL; INFILTRATION; INTRACAUDAL; PERINEURAL
Status: DISCONTINUED
Start: 2023-02-11 | End: 2023-02-11 | Stop reason: WASHOUT

## 2023-02-11 RX ORDER — EPHEDRINE SULFATE 50 MG/ML
5 INJECTION, SOLUTION INTRAMUSCULAR; INTRAVENOUS; SUBCUTANEOUS
Status: DISCONTINUED | OUTPATIENT
Start: 2023-02-11 | End: 2023-02-11 | Stop reason: HOSPADM

## 2023-02-11 RX ORDER — FENTANYL CITRATE 50 UG/ML
100 INJECTION, SOLUTION INTRAMUSCULAR; INTRAVENOUS
Status: DISCONTINUED | OUTPATIENT
Start: 2023-02-11 | End: 2023-02-11 | Stop reason: HOSPADM

## 2023-02-11 RX ORDER — NALBUPHINE HYDROCHLORIDE 10 MG/ML
2.5-5 INJECTION, SOLUTION INTRAMUSCULAR; INTRAVENOUS; SUBCUTANEOUS EVERY 6 HOURS PRN
Status: DISCONTINUED | OUTPATIENT
Start: 2023-02-11 | End: 2023-02-12

## 2023-02-11 RX ORDER — FENTANYL/BUPIVACAINE/NS/PF 2-1250MCG
PLASTIC BAG, INJECTION (ML) INJECTION
Status: DISCONTINUED
Start: 2023-02-11 | End: 2023-02-12 | Stop reason: HOSPADM

## 2023-02-11 RX ORDER — PROCHLORPERAZINE 25 MG
25 SUPPOSITORY, RECTAL RECTAL EVERY 12 HOURS PRN
Status: DISCONTINUED | OUTPATIENT
Start: 2023-02-11 | End: 2023-02-11 | Stop reason: HOSPADM

## 2023-02-11 RX ORDER — METHYLERGONOVINE MALEATE 0.2 MG/ML
200 INJECTION INTRAVENOUS
Status: DISCONTINUED | OUTPATIENT
Start: 2023-02-11 | End: 2023-02-13 | Stop reason: HOSPADM

## 2023-02-11 RX ADMIN — Medication: at 20:03

## 2023-02-11 RX ADMIN — SODIUM CHLORIDE, POTASSIUM CHLORIDE, SODIUM LACTATE AND CALCIUM CHLORIDE 800 ML: 600; 310; 30; 20 INJECTION, SOLUTION INTRAVENOUS at 19:36

## 2023-02-11 RX ADMIN — Medication 340 ML/HR: at 22:52

## 2023-02-11 RX ADMIN — SODIUM CHLORIDE, POTASSIUM CHLORIDE, SODIUM LACTATE AND CALCIUM CHLORIDE: 600; 310; 30; 20 INJECTION, SOLUTION INTRAVENOUS at 20:58

## 2023-02-11 ASSESSMENT — ACTIVITIES OF DAILY LIVING (ADL)
ADLS_ACUITY_SCORE: 20
ADLS_ACUITY_SCORE: 33
ADLS_ACUITY_SCORE: 20
ADLS_ACUITY_SCORE: 33

## 2023-02-11 NOTE — PROVIDER NOTIFICATION
02/11/23 9762   Provider Notification   Provider Name/Title Dr. Gallagher   Method of Notification Phone   Request Evaluate - Remote   Notification Reason Patient Arrived;Labor Status;Uterine Activity;Pain;Status Update;SVE     MD updated: Patient here for r/o labor. Had membranes stripped yesterday, was irregularly tiara but since 1400 contractions have been every 4-7 minutes, currently breathing through them. Tiara every 4-7mins on toco, palpate moderate. SVE 2.5/100/-2, was 2/80/-2 in clinic yesterday. FHT's category one with accelerations. Plan to recheck SVE in an hour and update MD at that time.

## 2023-02-11 NOTE — TELEPHONE ENCOUNTER
OB Triage Call      Is patient's OB/Midwife with the [facility] or [facility]? [facility]- Proceed with triage     Reason for call: Patient calling to report contractions 5 to 6 minutes apart now for about an hour.      Assessment: Patient is 39 weeks pregnant. .  Patient reports her contractions are lasting around 1 minute.  She reports having a lot of blood tinged mucus coming out of her vagina, but she also reports she had a membrane sweep yesterday.  Patient is reporting severe abd pain when her contractions come.  Patient reports the pain in wrapping around to her back.    Plan: Go to L & D for evaluation.    Patient plans to deliver at [facility]     Patient's primary OB Provider is Edmundo Driscoll.      Per protocol recommendations Patient to be evaluated in L&D. Patient's primary OB is [facility] Physician.  Labor and delivery at [facility] ([phone]) notified of patient's pending arrival.  Report given to Ivy.      Is patient's delivering hospital on divert? No.      39w0d    Estimated Date of Delivery: 2023        OB History    Para Term  AB Living   4 2 2 0 1 2   SAB IAB Ectopic Multiple Live Births   0 1 0 0 2      # Outcome Date GA Lbr Shyam/2nd Weight Sex Delivery Anes PTL Lv   4 Current            3 Term 18 39w5d 04:11 / 00:13 3.16 kg (6 lb 15.5 oz) M Vag-Spont EPI N AMINAH      Name: DIDI CARRILLO      Apgar1: 9  Apgar5: 9   2 Term 05/12/15 40w3d 07:21 / 04:06 3.039 kg (6 lb 11.2 oz) M Vag-Spont EPI N AMINAH      Name: Carlos      Apgar1: 9  Apgar5: 9   1 IAB  8w0d              Lab Results   Component Value Date    GBS Negative 05/15/2018          Rachael Padgett RN 23 3:21 PM  [facility] [place] Nurse Advisor      Reason for Disposition    [1] History of prior delivery (multipara) AND [2] contractions < 10 minutes apart AND [3] present 1 hour    Additional Information    Negative: Passed out (i.e., lost consciousness, collapsed and was not responding)     "Negative: Shock suspected (e.g., cold/pale/clammy skin, too weak to stand, low BP, rapid pulse)    Negative: Difficult to awaken or acting confused (e.g., disoriented, slurred speech)    Negative: [1] SEVERE abdominal pain (e.g., excruciating) AND [2] constant AND [3] present > 1 hour    Negative: Severe bleeding (e.g., continuous red blood from vagina, or large blood clots)    Negative: Umbilical cord hanging out of the vagina (shiny, white, curled appearance, \"like telephone cord\")    Negative: Uncontrollable urge to push (i.e., feels like baby is coming out now)    Negative: Can see baby    Negative: Sounds like a life-threatening emergency to the triager    Negative: [1] First baby (primipara) AND [2] contractions < 6 minutes apart  AND [3] present 2 hours    Protocols used: PREGNANCY - LABOR-A-AH      "

## 2023-02-11 NOTE — PLAN OF CARE
Data: Patient presented to [facility]: 2023  4:18 PM.  Reason for maternal/fetal assessment is uterine contractions. Patient reports she had her membranes stripped in clinic yesterday and has been irregularly tiara since. Since 1400 they have been every 6-7 mins. Rates them a 5/10 on pain scale, breathing through them.  Patient is a .  Prenatal record reviewed. Pregnancy  has been complicated by AMA.  Gestational Age 39w0d. VSS. Fetal movement active. Patient denies leaking of vaginal fluid/rupture of membranes, nausea, vomiting, headache, visual disturbances, epigastric or URQ pain, significant edema. Support person Dante is present.   Action: Verbal consent for EFM. Triage assessment completed. Bill of rights reviewed. SVE 2.5/100/-2.   Response: Patient verbalized agreement with plan. Will contact Dr Lizette Gallagher with update and for further orders.

## 2023-02-12 LAB
HGB BLD-MCNC: 12.2 G/DL (ref 11.7–15.7)
T PALLIDUM AB SER QL: NONREACTIVE

## 2023-02-12 PROCEDURE — 85018 HEMOGLOBIN: CPT | Performed by: OBSTETRICS & GYNECOLOGY

## 2023-02-12 PROCEDURE — 36415 COLL VENOUS BLD VENIPUNCTURE: CPT | Performed by: OBSTETRICS & GYNECOLOGY

## 2023-02-12 PROCEDURE — 250N000013 HC RX MED GY IP 250 OP 250 PS 637: Performed by: OBSTETRICS & GYNECOLOGY

## 2023-02-12 PROCEDURE — 120N000001 HC R&B MED SURG/OB

## 2023-02-12 RX ORDER — HYDROXYZINE HYDROCHLORIDE 50 MG/1
100 TABLET, FILM COATED ORAL
Status: DISCONTINUED | OUTPATIENT
Start: 2023-02-12 | End: 2023-02-12

## 2023-02-12 RX ORDER — HYDROXYZINE HYDROCHLORIDE 50 MG/1
100 TABLET, FILM COATED ORAL
Status: DISCONTINUED | OUTPATIENT
Start: 2023-02-12 | End: 2023-02-13 | Stop reason: HOSPADM

## 2023-02-12 RX ORDER — HYDROXYZINE HYDROCHLORIDE 50 MG/1
50 TABLET, FILM COATED ORAL
Status: DISCONTINUED | OUTPATIENT
Start: 2023-02-12 | End: 2023-02-13 | Stop reason: HOSPADM

## 2023-02-12 RX ORDER — HYDROXYZINE HYDROCHLORIDE 50 MG/1
50 TABLET, FILM COATED ORAL EVERY 6 HOURS PRN
Status: DISCONTINUED | OUTPATIENT
Start: 2023-02-12 | End: 2023-02-12

## 2023-02-12 RX ADMIN — IBUPROFEN 800 MG: 800 TABLET, FILM COATED ORAL at 13:09

## 2023-02-12 RX ADMIN — IBUPROFEN 800 MG: 800 TABLET, FILM COATED ORAL at 06:50

## 2023-02-12 RX ADMIN — IBUPROFEN 800 MG: 800 TABLET, FILM COATED ORAL at 19:10

## 2023-02-12 RX ADMIN — HYDROXYZINE HYDROCHLORIDE 100 MG: 50 TABLET, FILM COATED ORAL at 01:43

## 2023-02-12 RX ADMIN — DOCUSATE SODIUM 100 MG: 100 CAPSULE, LIQUID FILLED ORAL at 09:07

## 2023-02-12 RX ADMIN — ACETAMINOPHEN 650 MG: 325 TABLET ORAL at 09:07

## 2023-02-12 RX ADMIN — HYDROXYZINE HYDROCHLORIDE 50 MG: 50 TABLET, FILM COATED ORAL at 22:38

## 2023-02-12 RX ADMIN — IBUPROFEN 800 MG: 800 TABLET, FILM COATED ORAL at 00:42

## 2023-02-12 RX ADMIN — ACETAMINOPHEN 650 MG: 325 TABLET ORAL at 17:12

## 2023-02-12 RX ADMIN — ACETAMINOPHEN 650 MG: 325 TABLET ORAL at 13:09

## 2023-02-12 RX ADMIN — ACETAMINOPHEN 650 MG: 325 TABLET ORAL at 21:33

## 2023-02-12 ASSESSMENT — ACTIVITIES OF DAILY LIVING (ADL)
ADLS_ACUITY_SCORE: 20

## 2023-02-12 NOTE — PROVIDER NOTIFICATION
02/11/23 1830   Provider Notification   Provider Name/Title Robert   Method of Notification At Bedside   Request Evaluate in Person   Notification Reason Status Update;SVE     Admit pt and get epidural vorb Robert

## 2023-02-12 NOTE — ANESTHESIA PREPROCEDURE EVALUATION
Anesthesia Pre-Procedure Evaluation    Patient: Ernie Jimenes   MRN: [id number] : 1985        Procedure : * No procedures listed *          Past Medical History:   Diagnosis Date     +++ OUTSIDE RECORDS SCAN  +++      Abnormal Pap smear of cervix      Anxiety 2015     ASCUS on Pap smear 2008    hpv 16 high risk, s/p colposcopy ob/gyn [place] basically nl     Esophageal reflux      History of colposcopy with cervical biopsy 2016    neg     History of human papillomavirus infection      LSIL (low grade squamous intraepithelial lesion) on Pap smear 2010     Personal history of tobacco use, presenting hazards to health      Urinary tract infection      Varicella       Past Surgical History:   Procedure Laterality Date     COLPOSCOPY,BX CERVIX/ENDOCERV CURR      basically nl, per Dr. Grant [facility]     DENTAL SURGERY       LEEP TX, CERVICAL  2018    MANUEL 3     NO       ZZHC CREATE EARDRUM OPENING,GEN ANESTH  age 1    P.E. Tubes      Allergies   Allergen Reactions     No Known Drug Allergies       Social History     Tobacco Use     Smoking status: Former     Packs/day: 0.25     Years: 4.00     Pack years: 1.00     Types: Cigarettes     Quit date:      Years since quittin.1     Smokeless tobacco: Never   Substance Use Topics     Alcohol use: No      Wt Readings from Last 1 Encounters:   02/10/23 93.4 kg (206 lb)        Anesthesia Evaluation   Pt has had prior anesthetic.         ROS/MED HX  ENT/Pulmonary:  - neg pulmonary ROS     Neurologic:       Cardiovascular:  - neg cardiovascular ROS     METS/Exercise Tolerance:     Hematologic:       Musculoskeletal:       GI/Hepatic:       Renal/Genitourinary:       Endo:       Psychiatric/Substance Use:       Infectious Disease:       Malignancy:       Other:            Physical Exam    Airway        Mallampati: III   TM distance: > 3 FB   Neck ROM: full     Respiratory Devices and Support         Dental            Cardiovascular             Pulmonary                   OUTSIDE LABS:  CBC:   Lab Results   Component Value Date    WBC 7.6 12/02/2022    WBC 8.1 07/22/2022    HGB 13.3 02/11/2023    HGB 12.7 12/02/2022    HCT 36.0 12/02/2022    HCT 41.1 07/22/2022     12/02/2022     07/22/2022     BMP: No results found for: NA, POTASSIUM, CHLORIDE, CO2, BUN, CR, GLC  COAGS: No results found for: PTT, INR, FIBR  POC:   Lab Results   Component Value Date    HCG Negative 12/10/2003     HEPATIC: No results found for: ALBUMIN, PROTTOTAL, ALT, AST, GGT, ALKPHOS, BILITOTAL, BILIDIRECT, MICAH  OTHER:   Lab Results   Component Value Date    TSH 1.94 05/14/2010       Anesthesia Plan    ASA Status:  2      Anesthesia Type: Epidural.              Consents    Anesthesia Plan(s) and associated risks, benefits, and realistic alternatives discussed. Questions answered and patient/representative(s) expressed understanding.    - Discussed:     - Discussed with:  Patient         Postoperative Care            Comments:           neg OB ROS.       Mukul Fletcher MD

## 2023-02-12 NOTE — L&D DELIVERY NOTE
VAGINAL DELIVERY PROCEDURE NOTE    PREOPERATIVE DIAGNOSIS:  1. Intrauterine pregnancy at 39w0d  2. Active labor    POSTOPERATIVE DIAGNOSIS:   1. Status post vaginal delivery  2. First degree vaginal laceration    PROCEDURE DATE: 2023    PROCEDURE:   1.   2. Laceration repair      STAFF SURGEON: Lizette Gallagher MD    ANESTHESIA: epidural    COMPLICATIONS: none    QBL: 50 cc.     SPECIMENS: none    FINDINGS:  female infant, Apgar scores of 8 and 9.  Delivered in OA presentation. Placenta with 3 vessel cord. Meconium: none.      INDICATIONS:  This is a 37 year old  with intrauterine pregnancy at 39w0d who presented to Labor and Delivery with increasing contractions. Her pregnancy has been uncomplicated.     PROCEDURE:  The patient was complete and pushing. Infant's head was delivered atraumatically over perineum in the OA position. Nuchal cord: times one, reduced. Anterior shoulder and posterior shoulders were easily delivered without problems followed by remainder of infant. Infant vigorous and crying. Drying and resuscitative measures performed. Delayed cord clamping was performed prior to cutting. Cord gases were not obtained. Pitocin in IV fluid was administered per protocol. The placenta delivered spontaneously intact with 3 vessel cord and revealed normal anatomy. Uterine massage was performed and the fundus was found to be firm. Vagina, cervix, and perineum were inspected for lacerations. First degree laceration was noted and repaired with 3.0 vicryl. All counts were correct. Mom and baby stable in room.    Primary OB: Americo Gallagher MD  11:32 PM  2023

## 2023-02-12 NOTE — PLAN OF CARE
VSS. Up ad judah. Voiding without difficulty. Lochia scant, no clots. Fundus firm and midline. Pain well managed with Ibuprofen. Formula feeding, tolerating well. Bonding well with infant.

## 2023-02-12 NOTE — ANESTHESIA PROCEDURE NOTES
Epidural catheter Procedure Note    Pre-Procedure   Staff -        Performed By: Anesthesiologist  Procedure Documentation  Procedure: epidural catheter   Comments:  Pre-Procedure  Performed by Mukul Fletcher MD  Location: OB.      PreAnesthestic Checklist: patient identified, IV checked, risks and benefits discussed, informed consent obtained, monitors and equipment checked, pre-op evaluation and at physician/surgeon's request.    Timeout   Correct Patient: Yes  Correct Procedure: Epidural catheter placement  Correct Site: Yes   Correct Position: Yes    Procedure Documentation  Procedure:   Epidural catheter block for Labor    Patient currently in labor and she and OBMD request a labor epidural to control her labor pains. Patient was interviewed and examined. Procedure and risks including but not limited to bleeding, infection, nerve injury, paralysis, PDPH, and inadequate block requiring intervention discussed with patient. Questions answered. This epidural is to be placed in anticipation of vaginal delivery.  She consents to the epidural procedure.  Time-out was performed.  I or my partners remain immediately available for management of any issues or complications and will monitor at appropriate intervals.  Procedure: Patient sitting. Betadine prep x 3. Sterile drape applied.  Mask and gloves used.  Lidocaine 1%  local infiltration at L 3-4.  17 G. Tuohy needle at L3-4 by loss of resistance into epidural space.  No CSF, paresthesia or blood. 1.5 % Lidocaine with 1:200,000 Epinephrine 5cc test dose. Epidural catheter inserted w/o resistance to 5 cm in epidural space. Then 0.125% bupivicaine with fentanyl 2 mcg/ml 12 cc with NS 5 cc.  Aspiration negative for blood and CSF.   Negative for neuro change, paresthesia or symptoms of intravascular injection or intrathecal injection.  Infusion orders written and infusion of 0.125% bupivicaine with fentanyl 2 mcg/ml at 10cc per hour started.    Mukul Fletcher MD           FOR  "[facility] ONLY:   Pain Team Contact information: please page the Pain Team Via [employer]. Search \"Pain\". During daytime hours, please page the attending first. At night please page the resident first.    "

## 2023-02-12 NOTE — PROVIDER NOTIFICATION
"   02/12/23 1712   Provider Notification   Provider Name/Title Dr. Roque   Method of Notification Electronic Page   Notification Reason Vital Signs Change     Text page sent \"BP was 126/93 at last check. Will recheck again in 4 hrs. Please let me know if you'd like something different. Thanks [phone]\"  "

## 2023-02-12 NOTE — H&P
No significant change in general health status based on examination of the patient, review of Nursing Admission Database and prenatal record.    EFW: 7lb-7.5 lbs    Here with contractions off and on for 2 days, stronger today.  On arrival, every 5-7 mins but changed to every 3-5.  Cervical exam on arrival 2.5, on my recheck 4-5/100/-2.    Plan:  Admit to L&D for labor.  Would like epidural, so will get an IV now and ask for that after.  Anticipate vaginal delivery.    Lizette Gallagher MD  [facility]

## 2023-02-12 NOTE — ANESTHESIA POSTPROCEDURE EVALUATION
Patient: Ernie Jimenes    Procedure: * No procedures listed *       Anesthesia Type:  Epidural    Note:  Disposition: Inpatient   Postop Pain Control: Uneventful            Sign Out: Well controlled pain   PONV: No   Neuro/Psych: Uneventful            Sign Out: Acceptable/Baseline neuro status   Airway/Respiratory: Uneventful            Sign Out: Acceptable/Baseline resp. status   CV/Hemodynamics: Uneventful            Sign Out: Acceptable CV status   Other NRE: NONE   DID A NON-ROUTINE EVENT OCCUR? No    Event details/Postop Comments:    Patient doing well.  Residual neuraxial block resolved with no reported numbness or paresthesias.  Ambulating, voiding, and eating without difficulty. Denies positional headache.  Pain well controlled. No bowel or bladder changes. Minimal back discomfort at epidural site. No apparent epidural complications.  Questions encouraged and answered.             Last vitals:  Vitals:    02/12/23 0045 02/12/23 0517 02/12/23 0907   BP: 120/68 124/87 134/82   Pulse:   94   Resp:  16 14   Temp:  98.2  F (36.8  C) 98.2  F (36.8  C)   SpO2:          Electronically Signed By: Nixon Kelsey MD  February 12, 2023  12:12 PM

## 2023-02-12 NOTE — PROVIDER NOTIFICATION
02/12/23 0125   Provider Notification   Provider Name/Title Dr. Gallagher   Method of Notification Electronic Page   Request Evaluate-Remote   Notification Reason Medication Request   Patient requesting Unisom. MD ordered Vistaril 100mg at bedtime PRN.

## 2023-02-12 NOTE — PROGRESS NOTES
[facility] Obstetrics Post-Partum Progress Note          Assessment and Plan:    Assessment:   Post-partum day #1  Normal spontaneous vaginal delivery  Delivered at 22:50 hrs 2/11/23  L&D complications: None      Doing well.  Clean wound without signs of infection.  Normal healing wound.  No excessive bleeding  Pain well-controlled.      Plan:   Ambulation encouraged  Breast feeding strategies discussed  Monitor wound for signs of infection  Pain control measures as needed  Reportable signs and symptoms dicussed with the patient  Anticipate discharge tomorrow  Pt desires discharge in am due to late hour of delivery           Interval History:   Doing well.  Pain is well-controlled.  No fevers.  No history of foul-smelling vaginal discharge.  Good appetite.  Denies chest pain, shortness of breath, nausea or vomiting.  Vaginal bleeding is similar to a heavy menstrual flow.  Ambulatory.  Breastfeeding well.          Significant Problems:      Past Medical History:   Diagnosis Date     +++ OUTSIDE RECORDS SCAN  +++      Abnormal Pap smear of cervix      Anxiety 07/14/2015     ASCUS on Pap smear 07/2008    hpv 16 high risk, s/p colposcopy ob/gyn [facility]     Esophageal reflux      History of colposcopy with cervical biopsy 08/11/2016    neg     History of human papillomavirus infection      LSIL (low grade squamous intraepithelial lesion) on Pap smear 05/14/2010     Personal history of tobacco use, presenting hazards to health      Urinary tract infection      Varicella              Review of Systems:    The patient denies any chest pain, shortness of breath, excessive pain, fever, chills, purulent drainage from the wound, nausea or vomiting.          Medications:     All medications related to the patient's surgery have been reviewed  Current Facility-Administered Medications   Medication     acetaminophen (TYLENOL) tablet 650 mg     benzocaine (AMERICAINE) 20 % topical spray     bisacodyl  (DULCOLAX) suppository 10 mg     carboprost (HEMABATE) injection 250 mcg     docusate sodium (COLACE) capsule 100 mg     hydrocortisone (Perianal) (ANUSOL-HC) 2.5 % cream     hydrOXYzine (ATARAX) tablet 100 mg    Or     hydrOXYzine (ATARAX) tablet 50 mg     ibuprofen (ADVIL/MOTRIN) tablet 800 mg     lanolin cream     methylergonovine (METHERGINE) injection 200 mcg     misoprostol (CYTOTEC) tablet 400 mcg    Or     misoprostol (CYTOTEC) tablet 800 mcg     No MMR Needed - Assessment: Patient does not need MMR vaccine     No Tdap Needed - Assessment: Patient does not need Tdap vaccine     oxytocin (PITOCIN) 30 units in 500 mL 0.9% NaCl infusion     oxytocin (PITOCIN) injection 10 Units     tranexamic acid 1 g in 100 mL NS IV bag (premix)             Physical Exam:   Vitals were reviewed  All vitals stable  Temp: 98.2  F (36.8  C) Temp src: Oral BP: 134/82 Pulse: 94   Resp: 14 SpO2: 99 %      Uterine fundus is firm, non-tender and at the level of the umbilicus          Data:     All laboratory data related to this surgery reviewed  Hemoglobin   Date Value Ref Range Status   02/11/2023 13.3 11.7 - 15.7 g/dL Final   12/02/2022 12.7 11.7 - 15.7 g/dL Final   07/22/2022 14.2 11.7 - 15.7 g/dL Final   06/06/2018 11.1 (L) 11.7 - 15.7 g/dL Final   06/05/2018 13.5 11.7 - 15.7 g/dL Final   03/02/2018 12.5 11.7 - 15.7 g/dL Final   11/09/2017 13.2 11.7 - 15.7 g/dL Final   02/10/2015 12.7 11.7 - 15.7 g/dL Final     No imaging studies have been ordered    Salvador Roque MD

## 2023-02-12 NOTE — PROGRESS NOTES
Data: Ernie Jimenes transferred to 446 via wheelchair at 0100. Baby transferred via parent's arms.  Action: Receiving unit notified of transfer: Yes. Patient and family notified of room change. Report given to BOB Sheffield at 0100. Belongings sent to receiving unit. Accompanied by Registered Nurse. Oriented patient to surroundings. Call light within reach. Patient was able to void in the post partum period prior to transfer. ID bands double-checked with receiving RN.  Response: Patient tolerated transfer and is stable.

## 2023-02-13 VITALS
HEART RATE: 89 BPM | OXYGEN SATURATION: 99 % | SYSTOLIC BLOOD PRESSURE: 133 MMHG | RESPIRATION RATE: 16 BRPM | DIASTOLIC BLOOD PRESSURE: 86 MMHG | TEMPERATURE: 98 F

## 2023-02-13 PROCEDURE — 250N000013 HC RX MED GY IP 250 OP 250 PS 637: Performed by: OBSTETRICS & GYNECOLOGY

## 2023-02-13 RX ADMIN — ACETAMINOPHEN 650 MG: 325 TABLET ORAL at 07:23

## 2023-02-13 RX ADMIN — ACETAMINOPHEN 650 MG: 325 TABLET ORAL at 01:25

## 2023-02-13 RX ADMIN — IBUPROFEN 800 MG: 800 TABLET, FILM COATED ORAL at 07:24

## 2023-02-13 RX ADMIN — IBUPROFEN 800 MG: 800 TABLET, FILM COATED ORAL at 01:25

## 2023-02-13 RX ADMIN — DOCUSATE SODIUM 100 MG: 100 CAPSULE, LIQUID FILLED ORAL at 09:44

## 2023-02-13 ASSESSMENT — ACTIVITIES OF DAILY LIVING (ADL)
ADLS_ACUITY_SCORE: 20

## 2023-02-13 NOTE — PROVIDER NOTIFICATION
02/13/23 0800   Provider Notification   Provider Name/Title Dr. Oh   Method of Notification Electronic Page   Request Evaluate-Remote   Notification Reason Other     FYI pt's /92.

## 2023-02-13 NOTE — PLAN OF CARE
VSS. Up ad judah. Voiding without difficulty. Lochia scant, no clots. Fundus firm and midline. Pain well managed with Tylenol and Ibuprofen. Formula feeding, tolerating well. Bonding well with infant.

## 2023-02-13 NOTE — PLAN OF CARE
Data: Vital signs within normal limits, aside from one elevated blood pressure, continuing to monitor. Postpartum checks within normal limits. Patient eating and drinking normally. Patient able to empty bladder independently and is up ambulating. Patient performing self cares and is able to care for infant.   Action: Patient medicated with tylenol and ibuprofen for pain and cramping. Patient reassessed within 1 hour after each medication and pain was improved - patient stated she was comfortable.   Response: Positive attachment behaviors observed with infant.   Plan: Anticipate discharge on 2/13/23.

## 2023-02-13 NOTE — DISCHARGE SUMMARY
Postpartum Progress Note  Ernie Jimenes  [id number]    Subjective:   Patiet doing well. Pain well controlled with POs. Denies nausea and vomiting. Ambulating without difficulty. Adequate PO intake. Bottle feeding without difficult. Scant lochia.     Objective:  /86 (BP Location: Left arm, Patient Position: Semi-Gonzales's, Cuff Size: Adult Regular)   Pulse 89   Temp 98  F (36.7  C) (Oral)   Resp 16   LMP 2022 (Exact Date)   SpO2 99%   Breastfeeding Unknown   General: resting comfortably, in NAD  Heart: regular rate, well perfused  Lungs: non-labored breathing, no cough  Abdomen: soft, non distended, appropriately tender to palpation, FF at U -1  Extremities: scant edema, non-tender to palpation    Labs:  Hemoglobin   Date Value Ref Range Status   2023 12.2 11.7 - 15.7 g/dL Final   2023 13.3 11.7 - 15.7 g/dL Final   2018 11.1 (L) 11.7 - 15.7 g/dL Final   2018 13.5 11.7 - 15.7 g/dL Final       Assessment/Plan:  37 year old  who is PPD#2 s/p uncomplicated .  Currently stable and doing well  - Routine post-partum cares  - Heme: stable, normal hemoglobin  - Pain: continue tylenol, ibuprofen, ice packs, hot packs as needed  - Baby: in room, doing well.   - Single elevated BP PP, recheck normal. Reviewed warning signs. Home health to check BP in 1-2 day.   - Dispo: d/c to home today    Ernie Jimenes was discharged to home in stable condition with the following instructions/medications:  1) Call for temperature > 100.4, foul smelling vaginal discharge, bleeding > 1 pad per hour x 2 hrs, pain not controlled by oral pain meds, thoughts of self-harm or harming the infant.  2) Contraception counseling was provided.  3) She was instructed to follow-up with her primary OB in 6 weeks for a routine postpartum visit, and in 1 week for a blood pressure check if having any blood pressure issues while hospitalized.  4) She was instructed to continue her PNV on discharge if  she wishes to breast feed her infant.  5) She was discharged home with recommendations to alternate ibuprofen and tylenol for pain as well as tub soaks and ice pads for perineal discomfort.      Dona Oh MD  OB/GYN

## 2023-02-13 NOTE — PLAN OF CARE
Pt vitally stable and PP assessment WDL prior to discharge. Pt had 1 /92, but re-checked and improved. Home health nurse will be re-assessing BP at home. Pt received complete discharge paperwork and home medications. Pt did not want breat pump for home, exclusively breastfeeding. Discharge outcomes on care plan met. Pt states understanding and comfort with self care and follow up care. Pt had no further questions at the time of discharge and no unmet needs were identified. ID bands double checked and verified with parents and . Electronic security band removed from . Pt discharged on 23.

## 2023-03-13 ENCOUNTER — MEDICAL CORRESPONDENCE (OUTPATIENT)
Dept: HEALTH INFORMATION MANAGEMENT | Facility: CLINIC | Age: 38
End: 2023-03-13

## 2023-03-22 ENCOUNTER — TELEPHONE (OUTPATIENT)
Dept: OBGYN | Facility: CLINIC | Age: 38
End: 2023-03-22
Payer: COMMERCIAL

## 2023-03-22 NOTE — TELEPHONE ENCOUNTER
Form received from: [facility] forms       Form requesting following info/need:      NETO needed?: no      Location of form: above [facility] desk      When completed the route for return: Just for review

## 2023-03-29 ENCOUNTER — PRENATAL OFFICE VISIT (OUTPATIENT)
Dept: OBGYN | Facility: CLINIC | Age: 38
End: 2023-03-29
Payer: COMMERCIAL

## 2023-03-29 VITALS
HEIGHT: 68 IN | SYSTOLIC BLOOD PRESSURE: 132 MMHG | DIASTOLIC BLOOD PRESSURE: 86 MMHG | BODY MASS INDEX: 30.77 KG/M2 | WEIGHT: 203 LBS

## 2023-03-29 DIAGNOSIS — Z30.09 GENERAL COUNSELING AND ADVICE FOR CONTRACEPTIVE MANAGEMENT: ICD-10-CM

## 2023-03-29 DIAGNOSIS — Z11.3 SCREEN FOR STD (SEXUALLY TRANSMITTED DISEASE): ICD-10-CM

## 2023-03-29 PROBLEM — Z98.890 H/O LEEP (LOOP ELECTROSURGICAL EXCISION PROCEDURE) OF CERVIX COMPLICATING PREGNANCY: Status: RESOLVED | Noted: 2022-07-25 | Resolved: 2023-03-29

## 2023-03-29 PROBLEM — O34.40 H/O LEEP (LOOP ELECTROSURGICAL EXCISION PROCEDURE) OF CERVIX COMPLICATING PREGNANCY: Status: RESOLVED | Noted: 2022-07-25 | Resolved: 2023-03-29

## 2023-03-29 PROBLEM — O09.529 AMA (ADVANCED MATERNAL AGE) MULTIGRAVIDA 35+: Status: RESOLVED | Noted: 2022-07-25 | Resolved: 2023-03-29

## 2023-03-29 PROCEDURE — 99207 PR POST PARTUM EXAM: CPT | Performed by: OBSTETRICS & GYNECOLOGY

## 2023-03-29 PROCEDURE — 87491 CHLMYD TRACH DNA AMP PROBE: CPT | Performed by: OBSTETRICS & GYNECOLOGY

## 2023-03-29 PROCEDURE — 87591 N.GONORRHOEAE DNA AMP PROB: CPT | Performed by: OBSTETRICS & GYNECOLOGY

## 2023-03-29 ASSESSMENT — PATIENT HEALTH QUESTIONNAIRE - PHQ9: SUM OF ALL RESPONSES TO PHQ QUESTIONS 1-9: 0

## 2023-03-29 NOTE — PROGRESS NOTES
"SUBJECTIVE: Ernie is here for a 6-week postpartum checkup.    Delivery date was 2023. She had a  of a viable girl, weight 6 pounds 13 oz., with no complications.  Since delivery, she has not been breast feeding.  She has no signs of infection, bleeding or other complications.  She is not pregnant.  We discussed contraceptions and she has chosen unsure.  She  has not had intercourse since delivery and complains of No discomfort. Patient screened for postpartum depression and complaints are NEGATIVE. Screening has also been completed for intimate partner violence.  Next Pap/HPV due after 2023.    EXAM:  Today's Depression Rating was   PHQ-9 SCORE 2018   PHQ-9 Total Score -   PHQ-9 Total Score 1     /86 (BP Location: Right arm, Patient Position: Sitting, Cuff Size: Adult Regular)   Ht 1.727 m (5' 8\")   Wt 92.1 kg (203 lb)   LMP 2022 (Exact Date)   Breastfeeding No   BMI 30.87 kg/m    Abdomen- Abdomen soft, non-tender. BS normal. No masses, organomegaly  Pelvic- Exam chaperoned by nurse, External genitalia normal, Bartholin's glands normal, Artesia's glands normal, Urethral meatus normal, Urethra normal, Bladder normal, Vagina with normal rugae, no abnormal lesions, no abnormal discharge, Normal cervix without lesions or mucopus, no cervical motion tenderness, Uterus normal size, shape, and contour, no masses, non-tender, Adnexa normal size without masses or tenderness bilaterally, Anus normal      ASSESSMENT:   Encounter Diagnoses   Name Primary?     Postpartum care following vaginal delivery Yes     General counseling and advice for contraceptive management      Screen for STD (sexually transmitted disease)          PLAN:  1)  GC/Chlam done    2)  RTC during menses for Mirena IUD.    3)  Return 1 year for next expected pap, pelvic, or breast exam.      Edmundo Driscoll MD    "

## 2023-03-29 NOTE — NURSING NOTE
"Chief Complaint   Patient presents with     Post Partum Exam     2023         Initial /86 (BP Location: Right arm, Patient Position: Sitting, Cuff Size: Adult Regular)   Ht 1.727 m (5' 8\")   Wt 92.1 kg (203 lb)   LMP 2022 (Exact Date)   Breastfeeding No   BMI 30.87 kg/m   Estimated body mass index is 30.87 kg/m  as calculated from the following:    Height as of this encounter: 1.727 m (5' 8\").    Weight as of this encounter: 92.1 kg (203 lb).  BP completed using cuff size: regular    Questioned patient about current smoking habits.  Pt. has never smoked.          The following HM Due: NONE    Dago Kelsey CMA                "

## 2023-03-30 LAB
C TRACH DNA SPEC QL NAA+PROBE: NEGATIVE
N GONORRHOEA DNA SPEC QL NAA+PROBE: NEGATIVE

## 2023-04-15 ENCOUNTER — HEALTH MAINTENANCE LETTER (OUTPATIENT)
Age: 38
End: 2023-04-15

## 2023-04-28 ENCOUNTER — OFFICE VISIT (OUTPATIENT)
Dept: OBGYN | Facility: CLINIC | Age: 38
End: 2023-04-28
Payer: COMMERCIAL

## 2023-04-28 VITALS
BODY MASS INDEX: 30.17 KG/M2 | HEART RATE: 98 BPM | DIASTOLIC BLOOD PRESSURE: 88 MMHG | WEIGHT: 198.4 LBS | SYSTOLIC BLOOD PRESSURE: 122 MMHG

## 2023-04-28 DIAGNOSIS — Z01.812 PRE-PROCEDURE LAB EXAM: ICD-10-CM

## 2023-04-28 DIAGNOSIS — Z30.430 ENCOUNTER FOR INSERTION OF INTRAUTERINE CONTRACEPTIVE DEVICE: Primary | ICD-10-CM

## 2023-04-28 PROCEDURE — 58300 INSERT INTRAUTERINE DEVICE: CPT | Performed by: OBSTETRICS & GYNECOLOGY

## 2023-04-28 NOTE — NURSING NOTE
"Chief Complaint   Patient presents with     Contraception     Mirena IUD insert          Initial /88 (BP Location: Right arm, Cuff Size: Adult Regular)   Pulse 98   Wt 90 kg (198 lb 6.4 oz)   LMP 2023 (Exact Date)   Breastfeeding No   BMI 30.17 kg/m   Estimated body mass index is 30.17 kg/m  as calculated from the following:    Height as of 3/29/23: 1.727 m (5' 8\").    Weight as of this encounter: 90 kg (198 lb 6.4 oz).  BP completed using cuff size: regular    Questioned patient about current smoking habits.  Pt. quit smoking some time ago.          The following HM Due: NONE      Luli Sullivan CMA on 2023 at 11:38 AM       "

## 2024-01-31 ENCOUNTER — OFFICE VISIT (OUTPATIENT)
Dept: URGENT CARE | Facility: URGENT CARE | Age: 39
End: 2024-01-31
Payer: COMMERCIAL

## 2024-01-31 VITALS
SYSTOLIC BLOOD PRESSURE: 138 MMHG | DIASTOLIC BLOOD PRESSURE: 87 MMHG | OXYGEN SATURATION: 98 % | TEMPERATURE: 97.8 F | HEART RATE: 90 BPM | RESPIRATION RATE: 16 BRPM

## 2024-01-31 DIAGNOSIS — H10.029 PINK EYE, UNSPECIFIED LATERALITY: Primary | ICD-10-CM

## 2024-01-31 PROCEDURE — 99213 OFFICE O/P EST LOW 20 MIN: CPT | Performed by: FAMILY MEDICINE

## 2024-01-31 RX ORDER — TOBRAMYCIN 3 MG/ML
2 SOLUTION/ DROPS OPHTHALMIC 4 TIMES DAILY
Qty: 5 ML | Refills: 0 | Status: SHIPPED | OUTPATIENT
Start: 2024-01-31 | End: 2024-02-07

## 2024-01-31 NOTE — PROGRESS NOTES
SUBJECTIVE:Chief Complaint:   Chief Complaint   Patient presents with    Urgent Care     Pt reports  pink eye in both eyes       History of Present Illness: Ernie Jimenes is a 38 year old female who presents complaining of both eyes mattering and redness for days.  Onset/timing: gradual.   Contact wearer : Yes     Past Medical History:   Diagnosis Date    +++ OUTSIDE RECORDS SCAN  +++     Abnormal Pap smear of cervix     Anxiety 2015    ASCUS on Pap smear 2008    hpv 16 high risk, s/p colposcopy ob/gyn [facility]    Esophageal reflux     History of colposcopy with cervical biopsy 2016    neg    History of human papillomavirus infection     LSIL (low grade squamous intraepithelial lesion) on Pap smear 2010    Personal history of tobacco use, presenting hazards to health     Urinary tract infection     Varicella      Allergies   Allergen Reactions    No Known Drug Allergy      Social History     Tobacco Use    Smoking status: Former     Packs/day: 0.25     Years: 4.00     Additional pack years: 0.00     Total pack years: 1.00     Types: Cigarettes     Quit date:      Years since quittin.0    Smokeless tobacco: Never   Substance Use Topics    Alcohol use: No       ROS:  negative for photophobia, pain, vision change    OBJECTIVE:  /87   Pulse 90   Temp 97.8  F (36.6  C) (Tympanic)   Resp 16   SpO2 98%    General: no acute distress  Eye exam: right eye abnormal findings: conjunctivitis with erythema, discharge and matting noted, left eye abnormal findings: conjunctivitis with erythema, discharge and matting noted.  NIDIA, EOMI, fundi normal, corneas normal, no foreign bodies, visual acuity normal both eyes, no periorbital cellulitis      ICD-10-CM    1. Pink eye, unspecified laterality  H10.029 tobramycin (TOBREX) 0.3 % ophthalmic solution        No contact lens wearing  Warm packs for comfort.

## 2024-02-16 NOTE — PROGRESS NOTES
SUBJECTIVE:        I spent 10 minutes on the care of Kadeem on the day of service including 5 minutes of face-to-face time with remainder in chart review, care coordination, documentation on the day of service.                                               Ernie Jimenes is a 38 year old female who presents to clinic today for the following health issue(s):  No chief complaint on file.    Kadeem is a 38-year-old who presents for IUD string check and Pap smear.  She had the IUD placed after the birth of her third child.  She has been following the string placement by the month and the last few months was unable to feel it.    She has no symptoms of irregular bleeding.  Type bleeding rashes itching burning or discharge.  Significant other and her are certainly not interested in having another child anytime soon.    Examination:  Pleasant and appropriate 38-year-old P30 1 3.  External genitalia are normal  Vaginal mucosa is normal  Cervix without lesion  There is a Mirena IUD string that is appropriate length that is laying on the cervix on the patient's left side    A Pap smear was done    Assessment is normal string check and also desiring Pap smear.    Plan is to confirm that the IUD is in proper position or placed at least and then a Pap smear is pending.          No LMP recorded. (Menstrual status: IUD)..     Patient is sexually active, .  Using IUD for contraception.    reports that she quit smoking about 2 years ago. Her smoking use included cigarettes. She has a 1 pack-year smoking history. She has never used smokeless tobacco.    STD testing offered?  Declined    Health maintenance updated:  no    Today's PHQ-2 Score:       3/29/2023    11:25 AM   PHQ-2 (  [employer])   Q1: Little interest or pleasure in doing things 0   Q2: Feeling down, depressed or hopeless 0   PHQ-2 Score 0     Today's PHQ-9 Score:       3/29/2023    11:25 AM   PHQ-9 SCORE   PHQ-9 Total Score 0     Today's JADEN-7 Score:         No data to display                Problem list and histories reviewed & adjusted, as indicated.  Additional history: as documented.    Patient Active Problem List   Diagnosis    Esophageal reflux    Personal history of tobacco use, presenting hazards to health    Severe cervical dysplasia, histologically confirmed    Anxiety    Indication for care in labor or delivery     Past Surgical History:   Procedure Laterality Date    COLPOSCOPY,BX CERVIX/ENDOCERV CURR      basically nl, per Dr. [facility] [place]    DENTAL SURGERY      LEEP TX, CERVICAL  2018    MANUEL 3    NO      ZZHC CREATE EARDRUM OPENING,GEN ANESTH  age 1    P.E. Tubes      Social History     Tobacco Use    Smoking status: Former     Packs/day: 0.25     Years: 4.00     Additional pack years: 0.00     Total pack years: 1.00     Types: Cigarettes     Quit date:      Years since quittin.1    Smokeless tobacco: Never   Substance Use Topics    Alcohol use: No      Problem (# of Occurrences) Relation (Name,Age of Onset)    Depression (1) Mother    Hypertension (1) Mother    Lipids (1) Mother    Thyroid Disease (1) Mother    Breast Cancer (2) Maternal Grandmother, Maternal Aunt    No Known Problems (3) Father, Sister, Sister              Current Outpatient Medications   Medication Sig    doxylamine (UNISOM) 25 MG TABS tablet Take 25 mg by mouth At Bedtime (Patient not taking: Reported on 3/29/2023)    levonorgestrel (MIRENA) 20 MCG/DAY IUD 1 each (20 mcg) by Intrauterine route once    Prenatal Vit-Fe Fumarate-FA (PRENATAL VITAMIN) 27-0.8 MG TABS  (Patient not taking: Reported on 2023)     No current facility-administered medications for this visit.     Allergies   Allergen Reactions    No Known Drug Allergy            OBJECTIVE:     There were no vitals taken for this visit.  There is no height or weight on file to calculate BMI.    Exam:       In-Clinic Test Results:  No results found for this or any previous visit (from the past  24 hour(s)).    ASSESSMENT/PLAN:                                                    See above    Ross Oh MD  [facility]

## 2024-02-27 ENCOUNTER — OFFICE VISIT (OUTPATIENT)
Dept: OBGYN | Facility: CLINIC | Age: 39
End: 2024-02-27
Payer: COMMERCIAL

## 2024-02-27 VITALS — SYSTOLIC BLOOD PRESSURE: 122 MMHG | DIASTOLIC BLOOD PRESSURE: 72 MMHG | WEIGHT: 172 LBS | BODY MASS INDEX: 26.15 KG/M2

## 2024-02-27 DIAGNOSIS — Z30.09 FAMILY PLANNING: Primary | ICD-10-CM

## 2024-02-27 DIAGNOSIS — Z12.4 SCREENING FOR MALIGNANT NEOPLASM OF CERVIX: ICD-10-CM

## 2024-02-27 PROCEDURE — 99212 OFFICE O/P EST SF 10 MIN: CPT | Performed by: OBSTETRICS & GYNECOLOGY

## 2024-02-27 PROCEDURE — G0145 SCR C/V CYTO,THINLAYER,RESCR: HCPCS | Performed by: OBSTETRICS & GYNECOLOGY

## 2024-02-27 PROCEDURE — 87624 HPV HI-RISK TYP POOLED RSLT: CPT | Performed by: OBSTETRICS & GYNECOLOGY

## 2024-03-01 LAB
BKR LAB AP GYN ADEQUACY: NORMAL
BKR LAB AP GYN INTERPRETATION: NORMAL
BKR LAB AP HPV REFLEX: NORMAL
BKR LAB AP PREVIOUS ABNORMAL: NORMAL
PATH REPORT.COMMENTS IMP SPEC: NORMAL
PATH REPORT.COMMENTS IMP SPEC: NORMAL
PATH REPORT.RELEVANT HX SPEC: NORMAL

## 2024-03-04 LAB
HUMAN PAPILLOMA VIRUS 16 DNA: NEGATIVE
HUMAN PAPILLOMA VIRUS 18 DNA: NEGATIVE
HUMAN PAPILLOMA VIRUS FINAL DIAGNOSIS: NORMAL
HUMAN PAPILLOMA VIRUS OTHER HR: NEGATIVE

## 2024-06-22 ENCOUNTER — HEALTH MAINTENANCE LETTER (OUTPATIENT)
Age: 39
End: 2024-06-22

## 2024-09-11 ENCOUNTER — PATIENT OUTREACH (OUTPATIENT)
Dept: CARE COORDINATION | Facility: CLINIC | Age: 39
End: 2024-09-11
Payer: COMMERCIAL

## 2024-09-11 NOTE — PROGRESS NOTES
Clinic Care Coordination Contact  Program:   [facility]     Renewal:UCARE   Date Applied:      [facility] Outreach:   9/11/24: CTA called to see if patient needed assistance with their [employer] Renewal. Patient declined needing assistance and no follow up needed   CTA WILL MAIL APPLICATION TO [facility]  Care   [facility]  Clinic Care Coordination  [phone]    Health Insurance:        Referral/Screening:

## 2025-01-04 ENCOUNTER — HEALTH MAINTENANCE LETTER (OUTPATIENT)
Age: 40
End: 2025-01-04

## 2025-07-12 ENCOUNTER — HEALTH MAINTENANCE LETTER (OUTPATIENT)
Age: 40
End: 2025-07-12

## 2025-07-22 ENCOUNTER — ANCILLARY PROCEDURE (OUTPATIENT)
Dept: MAMMOGRAPHY | Facility: CLINIC | Age: 40
End: 2025-07-22
Attending: FAMILY MEDICINE
Payer: COMMERCIAL

## 2025-07-22 DIAGNOSIS — Z12.31 VISIT FOR SCREENING MAMMOGRAM: ICD-10-CM

## 2025-07-22 PROCEDURE — 77063 BREAST TOMOSYNTHESIS BI: CPT | Mod: TC | Performed by: RADIOLOGY

## 2025-07-22 PROCEDURE — 77067 SCR MAMMO BI INCL CAD: CPT | Mod: TC | Performed by: RADIOLOGY
